# Patient Record
Sex: FEMALE | Race: WHITE | Employment: OTHER | URBAN - NONMETROPOLITAN AREA
[De-identification: names, ages, dates, MRNs, and addresses within clinical notes are randomized per-mention and may not be internally consistent; named-entity substitution may affect disease eponyms.]

---

## 2018-04-17 ENCOUNTER — HOSPITAL ENCOUNTER (OUTPATIENT)
Age: 58
Discharge: HOME | End: 2018-04-17
Payer: COMMERCIAL

## 2018-04-17 LAB — TSH SERPL DL<=0.05 MIU/L-ACNC: 3.54 MIU/L (ref 0.3–5)

## 2018-04-17 PROCEDURE — 84443 ASSAY THYROID STIM HORMONE: CPT

## 2018-04-17 PROCEDURE — 36415 COLL VENOUS BLD VENIPUNCTURE: CPT

## 2018-11-16 ENCOUNTER — HOSPITAL ENCOUNTER (OUTPATIENT)
Dept: CT IMAGING | Age: 58
Discharge: HOME | End: 2018-11-18
Payer: COMMERCIAL

## 2018-11-16 ENCOUNTER — HOSPITAL ENCOUNTER (OUTPATIENT)
Age: 58
Setting detail: SPECIMEN
Discharge: HOME | End: 2018-11-16
Payer: COMMERCIAL

## 2018-11-16 ENCOUNTER — OFFICE VISIT (OUTPATIENT)
Dept: UROLOGY | Age: 58
End: 2018-11-16
Payer: COMMERCIAL

## 2018-11-16 VITALS
SYSTOLIC BLOOD PRESSURE: 127 MMHG | BODY MASS INDEX: 26.98 KG/M2 | DIASTOLIC BLOOD PRESSURE: 80 MMHG | HEART RATE: 89 BPM | TEMPERATURE: 98 F | WEIGHT: 178 LBS | HEIGHT: 68 IN

## 2018-11-16 DIAGNOSIS — N23 RENAL COLIC: ICD-10-CM

## 2018-11-16 DIAGNOSIS — N23 RENAL COLIC: Primary | ICD-10-CM

## 2018-11-16 DIAGNOSIS — K59.00 CONSTIPATION, UNSPECIFIED CONSTIPATION TYPE: ICD-10-CM

## 2018-11-16 LAB
-: ABNORMAL
AMORPHOUS: ABNORMAL
BACTERIA: ABNORMAL
BILIRUBIN URINE: NEGATIVE
CASTS UA: ABNORMAL /LPF
COLOR: YELLOW
COMMENT UA: ABNORMAL
CRYSTALS, UA: ABNORMAL /HPF
EPITHELIAL CELLS UA: ABNORMAL /HPF (ref 0–25)
GLUCOSE URINE: NEGATIVE
KETONES, URINE: NEGATIVE
LEUKOCYTE ESTERASE, URINE: ABNORMAL
MUCUS: ABNORMAL
NITRITE, URINE: NEGATIVE
OTHER OBSERVATIONS UA: ABNORMAL
PH UA: 6 (ref 5–9)
PROTEIN UA: NEGATIVE
RBC UA: ABNORMAL /HPF (ref 0–2)
RENAL EPITHELIAL, UA: ABNORMAL /HPF
SPECIFIC GRAVITY UA: >1.03 (ref 1.01–1.02)
TRICHOMONAS: ABNORMAL
TURBIDITY: ABNORMAL
URINE HGB: ABNORMAL
UROBILINOGEN, URINE: NORMAL
WBC UA: ABNORMAL /HPF (ref 0–5)
YEAST: ABNORMAL

## 2018-11-16 PROCEDURE — G8419 CALC BMI OUT NRM PARAM NOF/U: HCPCS | Performed by: NURSE PRACTITIONER

## 2018-11-16 PROCEDURE — G8427 DOCREV CUR MEDS BY ELIG CLIN: HCPCS | Performed by: NURSE PRACTITIONER

## 2018-11-16 PROCEDURE — 81001 URINALYSIS AUTO W/SCOPE: CPT

## 2018-11-16 PROCEDURE — 3017F COLORECTAL CA SCREEN DOC REV: CPT | Performed by: NURSE PRACTITIONER

## 2018-11-16 PROCEDURE — G8484 FLU IMMUNIZE NO ADMIN: HCPCS | Performed by: NURSE PRACTITIONER

## 2018-11-16 PROCEDURE — 87086 URINE CULTURE/COLONY COUNT: CPT

## 2018-11-16 PROCEDURE — 74176 CT ABD & PELVIS W/O CONTRAST: CPT

## 2018-11-16 PROCEDURE — 1036F TOBACCO NON-USER: CPT | Performed by: NURSE PRACTITIONER

## 2018-11-16 PROCEDURE — 99204 OFFICE O/P NEW MOD 45 MIN: CPT | Performed by: NURSE PRACTITIONER

## 2018-11-16 RX ORDER — OMEGA-3 FATTY ACIDS/FISH OIL 300-1000MG
1 CAPSULE ORAL PRN
COMMUNITY
End: 2022-08-21

## 2018-11-16 RX ORDER — SILODOSIN 8 MG/1
8 CAPSULE ORAL EVERY EVENING
Qty: 30 CAPSULE | Refills: 0 | Status: SHIPPED | OUTPATIENT
Start: 2018-11-16 | End: 2018-11-19

## 2018-11-16 NOTE — PROGRESS NOTES
Bladderscan performed in office today:  Pt voided - 10 mL, PVR - 0 mL
this medication, may cause fatigue). 12 tablet 0     No facility-administered encounter medications on file as of 11/16/2018. Current Outpatient Prescriptions on File Prior to Visit   Medication Sig Dispense Refill    metFORMIN (GLUCOPHAGE) 500 MG tablet Take 500 mg by mouth 4 times daily       levothyroxine (SYNTHROID) 88 MCG tablet Take 88 mcg by mouth Daily. No current facility-administered medications on file prior to visit. Bactrim [sulfamethoxazole-trimethoprim] and Macrobid [nitrofurantoin monohyd macro]  Family History   Problem Relation Age of Onset    Diabetes Father     Kidney Disease Father     Cancer Father      History   Smoking Status    Never Smoker   Smokeless Tobacco    Never Used       History   Alcohol Use No       Review of Systems   Constitutional: Negative for appetite change, chills and fever. Eyes: Negative for pain, redness and visual disturbance. Respiratory: Negative for cough, shortness of breath and wheezing. Cardiovascular: Negative for chest pain and leg swelling. Gastrointestinal: Positive for abdominal pain and constipation. Negative for nausea and vomiting. Genitourinary: Positive for flank pain. Negative for difficulty urinating, dysuria, frequency, hematuria, pelvic pain, urgency, vaginal bleeding and vaginal discharge. Musculoskeletal: Negative for back pain, joint swelling and myalgias. Skin: Negative for color change, rash and wound. Neurological: Negative for dizziness, tremors and numbness. Hematological: Negative for adenopathy. Does not bruise/bleed easily. PHYSICAL EXAM:  Constitutional: Patient resting comfortably, in no acute distress. Neuro: Alert and oriented to person place and time. Cranial nerves grossly intact.     Psych: Mood and affect normal.  Skin: Warm, dry  HEENT: normocephalic, atraumatic  Lymphatics: No palpable lymphadenopathy  Lungs: Respiratory effort normal, unlabored  Cardiovascular:  Normal peripheral

## 2018-11-17 LAB
CULTURE: NO GROWTH
Lab: NORMAL
SPECIMEN DESCRIPTION: NORMAL
STATUS: NORMAL

## 2018-11-19 ENCOUNTER — TELEPHONE (OUTPATIENT)
Dept: UROLOGY | Age: 58
End: 2018-11-19

## 2018-11-19 PROBLEM — N23 RENAL COLIC: Status: ACTIVE | Noted: 2018-11-19

## 2018-11-19 PROBLEM — K59.00 CONSTIPATION: Status: ACTIVE | Noted: 2018-11-19

## 2018-11-19 ASSESSMENT — ENCOUNTER SYMPTOMS
NAUSEA: 0
CONSTIPATION: 1
COLOR CHANGE: 0
EYE PAIN: 0
WHEEZING: 0
VOMITING: 0
BACK PAIN: 0
SHORTNESS OF BREATH: 0
EYE REDNESS: 0
COUGH: 0
ABDOMINAL PAIN: 1

## 2018-11-21 ENCOUNTER — HOSPITAL ENCOUNTER (OUTPATIENT)
Age: 58
Setting detail: SPECIMEN
Discharge: HOME | End: 2018-11-21
Payer: COMMERCIAL

## 2018-11-21 ENCOUNTER — OFFICE VISIT (OUTPATIENT)
Dept: UROLOGY | Age: 58
End: 2018-11-21
Payer: COMMERCIAL

## 2018-11-21 VITALS
WEIGHT: 181 LBS | DIASTOLIC BLOOD PRESSURE: 76 MMHG | BODY MASS INDEX: 27.43 KG/M2 | HEIGHT: 68 IN | SYSTOLIC BLOOD PRESSURE: 104 MMHG

## 2018-11-21 DIAGNOSIS — N20.0 RENAL STONES: ICD-10-CM

## 2018-11-21 DIAGNOSIS — K59.00 CONSTIPATION, UNSPECIFIED CONSTIPATION TYPE: Primary | ICD-10-CM

## 2018-11-21 PROCEDURE — G8484 FLU IMMUNIZE NO ADMIN: HCPCS | Performed by: NURSE PRACTITIONER

## 2018-11-21 PROCEDURE — 99214 OFFICE O/P EST MOD 30 MIN: CPT | Performed by: NURSE PRACTITIONER

## 2018-11-21 PROCEDURE — 3017F COLORECTAL CA SCREEN DOC REV: CPT | Performed by: NURSE PRACTITIONER

## 2018-11-21 PROCEDURE — 1036F TOBACCO NON-USER: CPT | Performed by: NURSE PRACTITIONER

## 2018-11-21 PROCEDURE — G8427 DOCREV CUR MEDS BY ELIG CLIN: HCPCS | Performed by: NURSE PRACTITIONER

## 2018-11-21 PROCEDURE — G8419 CALC BMI OUT NRM PARAM NOF/U: HCPCS | Performed by: NURSE PRACTITIONER

## 2018-11-21 ASSESSMENT — ENCOUNTER SYMPTOMS
EYE REDNESS: 0
ABDOMINAL PAIN: 0
SHORTNESS OF BREATH: 0
BACK PAIN: 0
CONSTIPATION: 1
NAUSEA: 0
VOMITING: 0
COUGH: 0
EYE PAIN: 0
WHEEZING: 0
COLOR CHANGE: 0

## 2018-11-21 NOTE — PROGRESS NOTES
BUN 24 (H) 12/01/2013     Lab Results   Component Value Date    CREATININE 0.92 12/01/2013     Review of Systems   Constitutional: Negative for appetite change, chills and fever. Eyes: Negative for pain, redness and visual disturbance. Respiratory: Negative for cough, shortness of breath and wheezing. Cardiovascular: Negative for chest pain and leg swelling. Gastrointestinal: Positive for constipation. Negative for abdominal pain, nausea and vomiting. Genitourinary: Negative for difficulty urinating, dysuria, flank pain, frequency, hematuria, pelvic pain, urgency, vaginal bleeding and vaginal discharge. Musculoskeletal: Negative for back pain, joint swelling and myalgias. Skin: Negative for color change, rash and wound. Neurological: Negative for dizziness, tremors and numbness. Hematological: Negative for adenopathy. Does not bruise/bleed easily. Objective:   Physical Exam    Assessment:       Diagnosis Orders   1. Constipation, unspecified constipation type     2. Renal stones             Plan:      She will continue MiraLAX daily. I urged her to consume 80 ounces of water daily. To prevent future stone formation:  1) drink around 80 ounces of water per day  2) Avoid/minimize intake of \"bad fluids\" (soda pop, coffee, tea, alcohol, energy drinks)  3) reduce consumption of sodium/salt  4) reduce consumption of fatty animal protein (full-fat cheese/milk/dairy, red meats, pork). Limit protein intake to low-fat/lean options (low-fat dairy, fish, chicken, turkey)      We will see her back in 3-4 months to reevaluate her bowels.   Smith Santamaria, NAHOMY - CNP

## 2018-12-20 ENCOUNTER — HOSPITAL ENCOUNTER (OUTPATIENT)
Dept: WOMENS IMAGING | Age: 58
Discharge: HOME | End: 2018-12-22
Payer: COMMERCIAL

## 2018-12-20 DIAGNOSIS — Z12.39 SCREENING BREAST EXAMINATION: ICD-10-CM

## 2018-12-20 PROCEDURE — 77063 BREAST TOMOSYNTHESIS BI: CPT

## 2018-12-20 PROCEDURE — 77067 SCR MAMMO BI INCL CAD: CPT

## 2019-05-08 ENCOUNTER — HOSPITAL ENCOUNTER (OUTPATIENT)
Age: 59
Setting detail: SPECIMEN
Discharge: HOME | End: 2019-05-08
Payer: COMMERCIAL

## 2019-05-08 ENCOUNTER — OFFICE VISIT (OUTPATIENT)
Dept: UROLOGY | Age: 59
End: 2019-05-08
Payer: COMMERCIAL

## 2019-05-08 VITALS
BODY MASS INDEX: 28.41 KG/M2 | SYSTOLIC BLOOD PRESSURE: 110 MMHG | DIASTOLIC BLOOD PRESSURE: 80 MMHG | WEIGHT: 181 LBS | HEIGHT: 67 IN

## 2019-05-08 DIAGNOSIS — R10.2 SUPRAPUBIC PAIN: ICD-10-CM

## 2019-05-08 DIAGNOSIS — K59.00 CONSTIPATION, UNSPECIFIED CONSTIPATION TYPE: ICD-10-CM

## 2019-05-08 DIAGNOSIS — R35.1 NOCTURIA: ICD-10-CM

## 2019-05-08 DIAGNOSIS — R10.2 SUPRAPUBIC PAIN: Primary | ICD-10-CM

## 2019-05-08 LAB
-: NORMAL
AMORPHOUS: NORMAL
BACTERIA: NORMAL
BILIRUBIN URINE: NEGATIVE
CASTS UA: NORMAL /LPF
COLOR: YELLOW
COMMENT UA: NORMAL
CRYSTALS, UA: NORMAL /HPF
DIRECT EXAM: NORMAL
EPITHELIAL CELLS UA: NORMAL /HPF (ref 0–25)
GLUCOSE URINE: NEGATIVE
KETONES, URINE: NEGATIVE
LEUKOCYTE ESTERASE, URINE: NEGATIVE
Lab: NORMAL
MUCUS: NORMAL
NITRITE, URINE: NEGATIVE
OTHER OBSERVATIONS UA: NORMAL
PH UA: 6 (ref 5–9)
PROTEIN UA: NEGATIVE
RBC UA: NORMAL /HPF (ref 0–2)
RENAL EPITHELIAL, UA: NORMAL /HPF
SPECIFIC GRAVITY UA: 1.02 (ref 1.01–1.02)
SPECIMEN DESCRIPTION: NORMAL
TRICHOMONAS: NORMAL
TURBIDITY: CLEAR
URINE HGB: NEGATIVE
UROBILINOGEN, URINE: NORMAL
WBC UA: NORMAL /HPF (ref 0–5)
YEAST: NORMAL

## 2019-05-08 PROCEDURE — 51798 US URINE CAPACITY MEASURE: CPT | Performed by: NURSE PRACTITIONER

## 2019-05-08 PROCEDURE — G8419 CALC BMI OUT NRM PARAM NOF/U: HCPCS | Performed by: NURSE PRACTITIONER

## 2019-05-08 PROCEDURE — 87210 SMEAR WET MOUNT SALINE/INK: CPT

## 2019-05-08 PROCEDURE — G8427 DOCREV CUR MEDS BY ELIG CLIN: HCPCS | Performed by: NURSE PRACTITIONER

## 2019-05-08 PROCEDURE — 81001 URINALYSIS AUTO W/SCOPE: CPT

## 2019-05-08 PROCEDURE — 3017F COLORECTAL CA SCREEN DOC REV: CPT | Performed by: NURSE PRACTITIONER

## 2019-05-08 PROCEDURE — 1036F TOBACCO NON-USER: CPT | Performed by: NURSE PRACTITIONER

## 2019-05-08 PROCEDURE — 99214 OFFICE O/P EST MOD 30 MIN: CPT | Performed by: NURSE PRACTITIONER

## 2019-05-08 PROCEDURE — 87086 URINE CULTURE/COLONY COUNT: CPT

## 2019-05-08 RX ORDER — POLYETHYLENE GLYCOL 3350 17 G/17G
17 POWDER, FOR SOLUTION ORAL DAILY
COMMUNITY
End: 2021-06-24

## 2019-05-08 ASSESSMENT — ENCOUNTER SYMPTOMS
WHEEZING: 0
CONSTIPATION: 1
VOMITING: 0
NAUSEA: 0
COUGH: 0
EYE PAIN: 0
EYE REDNESS: 0
SHORTNESS OF BREATH: 0
COLOR CHANGE: 0
ABDOMINAL PAIN: 0
BACK PAIN: 0

## 2019-05-08 NOTE — PROGRESS NOTES
HPI:    Patient is a 60 y.o. female in no acute distress. She is alert and oriented to person, place, and time. History  Referral from Dr. Abhijeet Sims for flank pain. Patient has had intermittent flank pain for several years. She does have a history of stones, has never had surgery. She spends a significant amount of time in [place] and has seen a urologist in [place] in the past.  She denies gross hematuria, dysuria, frequency, nocturia, urgency, or incontinence. She does report significant constipation, and has noticed worsening flank and abdominal pain with worsening bowel issues. She will go several days without a bowel movement. She denies history of frequent urinary tract infections. She did have a CT scan performed in 2013 that did show nonobstructing bilateral renal stones. 11/2018 CT showed bilateral punctate, nonobstructing stones. Recent urine culture was negative for infection, but did show 2-5 rbc/hpf. She is taking MiraLAX daily for her bowels. She denies any flank or abdominal pain, dysuria or gross hematuria. Previous urology records summarized below:  12/2013 CT urogram showed 2 x 4 distal ureteral stone, no filling defects to suggest malignancy. Cystoscopy was negative. She did do a 24-hour urine collection on 2/2014 that showed extreme hypercalciuria, borderline low urine pH, and mild hyperuricosuira. She was started on hydrochlorothiazide 12.5 mg daily. Today  Here today to follow-up for constipation. She was previously taking MiraLAX for 3 months and was having daily bowel movements. Over the last 4-6 weeks she did stop taking MiraLAX. Since stopping this she was evaluated in urgent care for her urinary tract infection. She was started on Omnicef. I do not have any culture results available. She has also developed suprapubic pain and nocturia ×4. At her prior visit she did not have any pain or urinary symptoms. Her symptoms are new since stopping the MiraLAX.   She denies any dysuria or gross hematuria. 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  [DISCONTINUED] metFORMIN (GLUCOPHAGE) 500 MG tablet Take 500 mg by mouth 4 times daily        No facility-administered encounter medications on file as of 5/8/2019. Current Outpatient Medications on File Prior to Visit   Medication Sig Dispense Refill    polyethylene glycol (GLYCOLAX) packet Take 17 g by mouth daily      ibuprofen (ADVIL) 200 MG CAPS Take 1 capsule by mouth as needed for Fever      levothyroxine (SYNTHROID) 88 MCG tablet Take 88 mcg by mouth Daily. No current facility-administered medications on file prior to visit. Bactrim [sulfamethoxazole-trimethoprim] and Macrobid [nitrofurantoin monohyd macro]  Family History   Problem Relation Age of Onset    Diabetes Father     Kidney Disease Father     Cancer Father      Social History     Tobacco Use   Smoking Status Never Smoker   Smokeless Tobacco Never Used       Social History     Substance and Sexual Activity   Alcohol Use No       Review of Systems   Constitutional: Negative for appetite change, chills and fever. Eyes: Negative for pain, redness and visual disturbance. Respiratory: Negative for cough, shortness of breath and wheezing. Cardiovascular: Negative for chest pain and leg swelling. Gastrointestinal: Positive for constipation. Negative for abdominal pain, nausea and vomiting. Genitourinary: Positive for enuresis and pelvic pain. Negative for difficulty urinating, dysuria, flank pain, frequency, hematuria, urgency, vaginal bleeding and vaginal discharge. Musculoskeletal: Negative for back pain, joint swelling and myalgias. Skin: Negative for color change, rash and wound. Neurological: Negative for dizziness, tremors and numbness. Hematological: Negative for adenopathy. Does not bruise/bleed easily. /80 (Site: Right Upper Arm, Position: Sitting, Cuff Size: Medium Adult)   Ht 5' 7\" (1.702 m)   Wt 181 lb (82.1 kg)   LMP  (LMP Unknown)   BMI 28.35 kg/m²       PHYSICAL EXAM:  Constitutional: Patient resting comfortably, in no acute distress. Neuro: Alert and oriented to person place and time. Cranial nerves grossly intact. Psych: Mood and affect normal.  Skin: Warm, dry  HEENT: normocephalic, atraumatic  Lymphatics: No palpable lymphadenopathy  Lungs: Respiratory effort normal, unlabored  Cardiovascular:  Normal peripheral pulses  Abdomen: Soft, non-tender, non-distended with no organomegaly or palpable masses. : No CVA tenderness. Bladder non-tender and not distended. Pelvic: Deferred    Lab Results   Component Value Date    BUN 24 (H) 12/01/2013     Lab Results   Component Value Date    CREATININE 0.92 12/01/2013       ASSESSMENT:   Diagnosis Orders   1. Suprapubic pain  Urinalysis with Microscopic    Urine culture clean catch    Wet prep, genital    CO MEASUREMENT,POST-VOID RESIDUAL VOLUME BY US,NON-IMAGING   2. Constipation, unspecified constipation type     3. Nocturia  Urinalysis with Microscopic    Urine culture clean catch    Wet prep, genital    CO MEASUREMENT,POST-VOID RESIDUAL VOLUME BY US,NON-IMAGING           PLAN:  I did offer to recheck a CT scan, but she declined. We will check a UA C&S and a vaginal swab. She will resume MiraLAX daily.       If all testing is negative I recommend that we proceed with a CT scan due to her history of stones. I also recommend that we proceed with cystoscopy due to her history of microscopic hematuria and the last cystoscopy she had was performed in 2013. We'll call patient with the results.

## 2019-05-08 NOTE — PATIENT INSTRUCTIONS
It is very important to have regular, daily, bowel movements because this will improve urinary symptoms. Take Miralax (or generic equivalent) 17 g once daily (one capful). Do not skip days. If 1 dose daily causes loose stools/diarrhea, decrease to 1/2 dose or 1/4 dose but be sure to continue taking it daily (it is not the type of medication that you can just use \"as needed,\" must be taken daily to be effective).        Be sure to increase fluids, drink 80 ounces of WATER every day

## 2019-05-08 NOTE — PROGRESS NOTES
Bladderscan performed in office today:  Pt voided - 100 mL, PVR - 0 mL      Obtained genital swab sample during the visit today.

## 2019-05-10 ENCOUNTER — TELEPHONE (OUTPATIENT)
Dept: UROLOGY | Age: 59
End: 2019-05-10

## 2019-05-10 LAB
CULTURE: NO GROWTH
Lab: NORMAL
SPECIMEN DESCRIPTION: NORMAL

## 2019-05-10 NOTE — TELEPHONE ENCOUNTER
----- Message from NAHOMY Taylor - CNP sent at 5/10/2019  7:22 AM EDT -----  Call pt - urine cx reviewed and negative for UTI, infection & for significant microhematuria. Take miralax daily. If she continues to have pain we should consider a CT and cystoscopy. If she would like to take miralax for three months and she how she does that is fine too.

## 2019-05-10 NOTE — RESULT ENCOUNTER NOTE
Call pt - urine cx reviewed and negative for UTI, infection & for significant microhematuria. Take miralax daily. If she continues to have pain we should consider a CT and cystoscopy. If she would like to take miralax for three months and she how she does that is fine too.

## 2019-05-20 ENCOUNTER — TELEPHONE (OUTPATIENT)
Dept: UROLOGY | Age: 59
End: 2019-05-20

## 2019-05-20 ENCOUNTER — HOSPITAL ENCOUNTER (OUTPATIENT)
Dept: CT IMAGING | Age: 59
Discharge: HOME | End: 2019-05-22
Payer: COMMERCIAL

## 2019-05-20 DIAGNOSIS — N23 RENAL COLIC: ICD-10-CM

## 2019-05-20 PROCEDURE — 74176 CT ABD & PELVIS W/O CONTRAST: CPT

## 2019-05-20 NOTE — TELEPHONE ENCOUNTER
----- Message from NAHOMY Crum CNP sent at 5/20/2019  2:51 PM EDT -----  Call patient and have her come in NPO tomorrow morning. CT shows a stone in the ureter.  [reviewed with Dr. Melissa Rouse

## 2019-05-21 ENCOUNTER — ANESTHESIA EVENT (OUTPATIENT)
Dept: OPERATING ROOM | Age: 59
End: 2019-05-21
Payer: COMMERCIAL

## 2019-05-21 ENCOUNTER — OFFICE VISIT (OUTPATIENT)
Dept: UROLOGY | Age: 59
End: 2019-05-21
Payer: COMMERCIAL

## 2019-05-21 ENCOUNTER — HOSPITAL ENCOUNTER (OUTPATIENT)
Age: 59
Discharge: HOME | End: 2019-05-21
Payer: COMMERCIAL

## 2019-05-21 ENCOUNTER — APPOINTMENT (OUTPATIENT)
Dept: GENERAL RADIOLOGY | Age: 59
End: 2019-05-21
Attending: UROLOGY
Payer: COMMERCIAL

## 2019-05-21 ENCOUNTER — ANESTHESIA (OUTPATIENT)
Dept: OPERATING ROOM | Age: 59
End: 2019-05-21
Payer: COMMERCIAL

## 2019-05-21 ENCOUNTER — HOSPITAL ENCOUNTER (OUTPATIENT)
Age: 59
Discharge: HOME | End: 2019-05-21
Attending: UROLOGY
Payer: COMMERCIAL

## 2019-05-21 ENCOUNTER — HOSPITAL ENCOUNTER (OUTPATIENT)
Age: 59
Setting detail: OUTPATIENT SURGERY
Discharge: HOME | End: 2019-05-21
Attending: UROLOGY | Admitting: UROLOGY
Payer: COMMERCIAL

## 2019-05-21 VITALS
BODY MASS INDEX: 28.88 KG/M2 | RESPIRATION RATE: 18 BRPM | DIASTOLIC BLOOD PRESSURE: 71 MMHG | HEART RATE: 61 BPM | SYSTOLIC BLOOD PRESSURE: 113 MMHG | OXYGEN SATURATION: 97 % | TEMPERATURE: 97.8 F | HEIGHT: 67 IN | WEIGHT: 184 LBS

## 2019-05-21 VITALS
SYSTOLIC BLOOD PRESSURE: 119 MMHG | TEMPERATURE: 98.6 F | OXYGEN SATURATION: 96 % | DIASTOLIC BLOOD PRESSURE: 64 MMHG | RESPIRATION RATE: 9 BRPM

## 2019-05-21 VITALS
DIASTOLIC BLOOD PRESSURE: 82 MMHG | TEMPERATURE: 97.9 F | WEIGHT: 184 LBS | BODY MASS INDEX: 28.82 KG/M2 | SYSTOLIC BLOOD PRESSURE: 106 MMHG

## 2019-05-21 DIAGNOSIS — Z01.818 PRE-OP TESTING: ICD-10-CM

## 2019-05-21 DIAGNOSIS — N23 RENAL COLIC: Primary | ICD-10-CM

## 2019-05-21 DIAGNOSIS — N20.1 URETERAL CALCULUS: ICD-10-CM

## 2019-05-21 DIAGNOSIS — N20.1 URETERAL STONE: Primary | ICD-10-CM

## 2019-05-21 DIAGNOSIS — N20.1 URETERAL STONE: ICD-10-CM

## 2019-05-21 DIAGNOSIS — N20.0 RENAL STONES: ICD-10-CM

## 2019-05-21 LAB
ANION GAP SERPL CALCULATED.3IONS-SCNC: 11 MMOL/L (ref 9–17)
BUN BLDV-MCNC: 23 MG/DL (ref 6–20)
BUN/CREAT BLD: 27 (ref 9–20)
CALCIUM SERPL-MCNC: 9.6 MG/DL (ref 8.6–10.4)
CHLORIDE BLD-SCNC: 102 MMOL/L (ref 98–107)
CO2: 27 MMOL/L (ref 20–31)
CREAT SERPL-MCNC: 0.85 MG/DL (ref 0.5–0.9)
EKG ATRIAL RATE: 69 BPM
EKG P AXIS: 33 DEGREES
EKG P-R INTERVAL: 120 MS
EKG Q-T INTERVAL: 392 MS
EKG QRS DURATION: 90 MS
EKG QTC CALCULATION (BAZETT): 420 MS
EKG R AXIS: 56 DEGREES
EKG T AXIS: 42 DEGREES
EKG VENTRICULAR RATE: 69 BPM
GFR AFRICAN AMERICAN: >60 ML/MIN
GFR NON-AFRICAN AMERICAN: >60 ML/MIN
GFR SERPL CREATININE-BSD FRML MDRD: ABNORMAL ML/MIN/{1.73_M2}
GFR SERPL CREATININE-BSD FRML MDRD: ABNORMAL ML/MIN/{1.73_M2}
GLUCOSE BLD-MCNC: 103 MG/DL (ref 70–99)
POTASSIUM SERPL-SCNC: 4.5 MMOL/L (ref 3.7–5.3)
SODIUM BLD-SCNC: 140 MMOL/L (ref 135–144)

## 2019-05-21 PROCEDURE — 82365 CALCULUS SPECTROSCOPY: CPT

## 2019-05-21 PROCEDURE — 3017F COLORECTAL CA SCREEN DOC REV: CPT | Performed by: NURSE PRACTITIONER

## 2019-05-21 PROCEDURE — 6360000002 HC RX W HCPCS: Performed by: UROLOGY

## 2019-05-21 PROCEDURE — 6370000000 HC RX 637 (ALT 250 FOR IP): Performed by: UROLOGY

## 2019-05-21 PROCEDURE — G8427 DOCREV CUR MEDS BY ELIG CLIN: HCPCS | Performed by: NURSE PRACTITIONER

## 2019-05-21 PROCEDURE — 93005 ELECTROCARDIOGRAM TRACING: CPT

## 2019-05-21 PROCEDURE — 99215 OFFICE O/P EST HI 40 MIN: CPT | Performed by: NURSE PRACTITIONER

## 2019-05-21 PROCEDURE — 2500000003 HC RX 250 WO HCPCS: Performed by: NURSE ANESTHETIST, CERTIFIED REGISTERED

## 2019-05-21 PROCEDURE — 3600000004 HC SURGERY LEVEL 4 BASE: Performed by: UROLOGY

## 2019-05-21 PROCEDURE — 2580000003 HC RX 258: Performed by: UROLOGY

## 2019-05-21 PROCEDURE — 3700000000 HC ANESTHESIA ATTENDED CARE: Performed by: UROLOGY

## 2019-05-21 PROCEDURE — 1036F TOBACCO NON-USER: CPT | Performed by: NURSE PRACTITIONER

## 2019-05-21 PROCEDURE — 7100000000 HC PACU RECOVERY - FIRST 15 MIN: Performed by: UROLOGY

## 2019-05-21 PROCEDURE — 3700000001 HC ADD 15 MINUTES (ANESTHESIA): Performed by: UROLOGY

## 2019-05-21 PROCEDURE — 3600000014 HC SURGERY LEVEL 4 ADDTL 15MIN: Performed by: UROLOGY

## 2019-05-21 PROCEDURE — 6360000002 HC RX W HCPCS: Performed by: NURSE ANESTHETIST, CERTIFIED REGISTERED

## 2019-05-21 PROCEDURE — 74018 RADEX ABDOMEN 1 VIEW: CPT

## 2019-05-21 PROCEDURE — 6370000000 HC RX 637 (ALT 250 FOR IP)

## 2019-05-21 PROCEDURE — 2709999900 HC NON-CHARGEABLE SUPPLY: Performed by: UROLOGY

## 2019-05-21 PROCEDURE — C1769 GUIDE WIRE: HCPCS | Performed by: UROLOGY

## 2019-05-21 PROCEDURE — G8419 CALC BMI OUT NRM PARAM NOF/U: HCPCS | Performed by: NURSE PRACTITIONER

## 2019-05-21 PROCEDURE — 36415 COLL VENOUS BLD VENIPUNCTURE: CPT

## 2019-05-21 PROCEDURE — 2720000010 HC SURG SUPPLY STERILE: Performed by: UROLOGY

## 2019-05-21 PROCEDURE — 80048 BASIC METABOLIC PNL TOTAL CA: CPT

## 2019-05-21 PROCEDURE — C2617 STENT, NON-COR, TEM W/O DEL: HCPCS | Performed by: UROLOGY

## 2019-05-21 PROCEDURE — 7100000001 HC PACU RECOVERY - ADDTL 15 MIN: Performed by: UROLOGY

## 2019-05-21 PROCEDURE — 7100000011 HC PHASE II RECOVERY - ADDTL 15 MIN: Performed by: UROLOGY

## 2019-05-21 PROCEDURE — 7100000010 HC PHASE II RECOVERY - FIRST 15 MIN: Performed by: UROLOGY

## 2019-05-21 DEVICE — URETERAL STENT
Type: IMPLANTABLE DEVICE | Site: URETER | Status: FUNCTIONAL
Brand: PERCUFLEX™ PLUS

## 2019-05-21 RX ORDER — ATROPA BELLADONNA AND OPIUM 16.2; 6 MG/1; MG/1
30 SUPPOSITORY RECTAL ONCE
Status: CANCELLED | OUTPATIENT
Start: 2019-05-21

## 2019-05-21 RX ORDER — HYDROCODONE BITARTRATE AND ACETAMINOPHEN 5; 325 MG/1; MG/1
1 TABLET ORAL EVERY 4 HOURS PRN
Qty: 18 TABLET | Refills: 0 | Status: SHIPPED | OUTPATIENT
Start: 2019-05-21 | End: 2019-05-24

## 2019-05-21 RX ORDER — DIMENHYDRINATE 50 MG/1
50 TABLET ORAL ONCE
Status: COMPLETED | OUTPATIENT
Start: 2019-05-21 | End: 2019-05-21

## 2019-05-21 RX ORDER — MEPERIDINE HYDROCHLORIDE 50 MG/ML
12.5 INJECTION INTRAMUSCULAR; INTRAVENOUS; SUBCUTANEOUS EVERY 5 MIN PRN
Status: DISCONTINUED | OUTPATIENT
Start: 2019-05-21 | End: 2019-05-21 | Stop reason: HOSPADM

## 2019-05-21 RX ORDER — HYDROCODONE BITARTRATE AND ACETAMINOPHEN 5; 325 MG/1; MG/1
1 TABLET ORAL
Status: COMPLETED | OUTPATIENT
Start: 2019-05-21 | End: 2019-05-21

## 2019-05-21 RX ORDER — ATROPA BELLADONNA AND OPIUM 16.2; 6 MG/1; MG/1
60 SUPPOSITORY RECTAL ONCE
Status: DISCONTINUED | OUTPATIENT
Start: 2019-05-21 | End: 2019-05-21

## 2019-05-21 RX ORDER — PROMETHAZINE HYDROCHLORIDE 25 MG/ML
6.25 INJECTION, SOLUTION INTRAMUSCULAR; INTRAVENOUS
Status: DISCONTINUED | OUTPATIENT
Start: 2019-05-21 | End: 2019-05-21 | Stop reason: HOSPADM

## 2019-05-21 RX ORDER — LIDOCAINE HYDROCHLORIDE 20 MG/ML
INJECTION, SOLUTION EPIDURAL; INFILTRATION; INTRACAUDAL; PERINEURAL PRN
Status: DISCONTINUED | OUTPATIENT
Start: 2019-05-21 | End: 2019-05-21 | Stop reason: SDUPTHER

## 2019-05-21 RX ORDER — DEXAMETHASONE SODIUM PHOSPHATE 4 MG/ML
INJECTION, SOLUTION INTRA-ARTICULAR; INTRALESIONAL; INTRAMUSCULAR; INTRAVENOUS; SOFT TISSUE PRN
Status: DISCONTINUED | OUTPATIENT
Start: 2019-05-21 | End: 2019-05-21 | Stop reason: SDUPTHER

## 2019-05-21 RX ORDER — ONDANSETRON 2 MG/ML
4 INJECTION INTRAMUSCULAR; INTRAVENOUS
Status: DISCONTINUED | OUTPATIENT
Start: 2019-05-21 | End: 2019-05-21 | Stop reason: HOSPADM

## 2019-05-21 RX ORDER — SODIUM CHLORIDE, SODIUM LACTATE, POTASSIUM CHLORIDE, CALCIUM CHLORIDE 600; 310; 30; 20 MG/100ML; MG/100ML; MG/100ML; MG/100ML
INJECTION, SOLUTION INTRAVENOUS CONTINUOUS
Status: DISCONTINUED | OUTPATIENT
Start: 2019-05-21 | End: 2019-05-21 | Stop reason: HOSPADM

## 2019-05-21 RX ORDER — ATROPA BELLADONNA AND OPIUM 16.2; 3 MG/1; MG/1
SUPPOSITORY RECTAL
Status: COMPLETED
Start: 2019-05-21 | End: 2019-05-21

## 2019-05-21 RX ORDER — FENTANYL CITRATE 50 UG/ML
50 INJECTION, SOLUTION INTRAMUSCULAR; INTRAVENOUS EVERY 5 MIN PRN
Status: DISCONTINUED | OUTPATIENT
Start: 2019-05-21 | End: 2019-05-21 | Stop reason: HOSPADM

## 2019-05-21 RX ORDER — ONDANSETRON 2 MG/ML
INJECTION INTRAMUSCULAR; INTRAVENOUS PRN
Status: DISCONTINUED | OUTPATIENT
Start: 2019-05-21 | End: 2019-05-21 | Stop reason: SDUPTHER

## 2019-05-21 RX ORDER — CIPROFLOXACIN 2 MG/ML
400 INJECTION, SOLUTION INTRAVENOUS
Status: COMPLETED | OUTPATIENT
Start: 2019-05-21 | End: 2019-05-21

## 2019-05-21 RX ORDER — ACETAMINOPHEN 325 MG/1
650 TABLET ORAL ONCE
Status: COMPLETED | OUTPATIENT
Start: 2019-05-21 | End: 2019-05-21

## 2019-05-21 RX ORDER — FENTANYL CITRATE 50 UG/ML
INJECTION, SOLUTION INTRAMUSCULAR; INTRAVENOUS PRN
Status: DISCONTINUED | OUTPATIENT
Start: 2019-05-21 | End: 2019-05-21 | Stop reason: SDUPTHER

## 2019-05-21 RX ORDER — KETOROLAC TROMETHAMINE 30 MG/ML
INJECTION, SOLUTION INTRAMUSCULAR; INTRAVENOUS PRN
Status: DISCONTINUED | OUTPATIENT
Start: 2019-05-21 | End: 2019-05-21 | Stop reason: SDUPTHER

## 2019-05-21 RX ORDER — PROPOFOL 10 MG/ML
INJECTION, EMULSION INTRAVENOUS PRN
Status: DISCONTINUED | OUTPATIENT
Start: 2019-05-21 | End: 2019-05-21 | Stop reason: SDUPTHER

## 2019-05-21 RX ADMIN — KETOROLAC TROMETHAMINE 30 MG: 30 INJECTION, SOLUTION INTRAMUSCULAR; INTRAVENOUS at 16:05

## 2019-05-21 RX ADMIN — ACETAMINOPHEN 650 MG: 325 TABLET, FILM COATED ORAL at 15:37

## 2019-05-21 RX ADMIN — CIPROFLOXACIN 400 MG: 2 INJECTION, SOLUTION INTRAVENOUS at 15:37

## 2019-05-21 RX ADMIN — FENTANYL CITRATE 50 MCG: 50 INJECTION INTRAMUSCULAR; INTRAVENOUS at 15:43

## 2019-05-21 RX ADMIN — DEXAMETHASONE SODIUM PHOSPHATE 8 MG: 4 INJECTION, SOLUTION INTRAMUSCULAR; INTRAVENOUS at 15:51

## 2019-05-21 RX ADMIN — PROPOFOL 150 MG: 10 INJECTION, EMULSION INTRAVENOUS at 15:45

## 2019-05-21 RX ADMIN — ATROPA BELLADONNA AND OPIUM 30 MG: 16.2; 3 SUPPOSITORY RECTAL at 17:51

## 2019-05-21 RX ADMIN — SODIUM CHLORIDE, POTASSIUM CHLORIDE, SODIUM LACTATE AND CALCIUM CHLORIDE: 600; 310; 30; 20 INJECTION, SOLUTION INTRAVENOUS at 15:37

## 2019-05-21 RX ADMIN — DIMENHYDRINATE 50 MG: 50 TABLET ORAL at 15:37

## 2019-05-21 RX ADMIN — FENTANYL CITRATE 25 MCG: 50 INJECTION INTRAMUSCULAR; INTRAVENOUS at 16:05

## 2019-05-21 RX ADMIN — ONDANSETRON 4 MG: 2 INJECTION INTRAMUSCULAR; INTRAVENOUS at 15:51

## 2019-05-21 RX ADMIN — FENTANYL CITRATE 25 MCG: 50 INJECTION INTRAMUSCULAR; INTRAVENOUS at 15:56

## 2019-05-21 RX ADMIN — HYDROCODONE BITARTRATE AND ACETAMINOPHEN 1 TABLET: 5; 325 TABLET ORAL at 17:08

## 2019-05-21 RX ADMIN — LIDOCAINE HYDROCHLORIDE 60 MG: 20 INJECTION, SOLUTION EPIDURAL; INFILTRATION; INTRACAUDAL at 15:45

## 2019-05-21 ASSESSMENT — PAIN DESCRIPTION - ONSET: ONSET: GRADUAL

## 2019-05-21 ASSESSMENT — PULMONARY FUNCTION TESTS
PIF_VALUE: 12
PIF_VALUE: 8
PIF_VALUE: 12
PIF_VALUE: 1
PIF_VALUE: 11
PIF_VALUE: 10
PIF_VALUE: 12
PIF_VALUE: 10
PIF_VALUE: 1
PIF_VALUE: 8
PIF_VALUE: 0
PIF_VALUE: 12
PIF_VALUE: 12
PIF_VALUE: 10
PIF_VALUE: 12
PIF_VALUE: 2
PIF_VALUE: 1
PIF_VALUE: 20
PIF_VALUE: 0
PIF_VALUE: 1
PIF_VALUE: 1
PIF_VALUE: 2
PIF_VALUE: 12
PIF_VALUE: 12
PIF_VALUE: 0
PIF_VALUE: 4
PIF_VALUE: 12
PIF_VALUE: 18
PIF_VALUE: 12
PIF_VALUE: 10
PIF_VALUE: 12
PIF_VALUE: 8
PIF_VALUE: 1

## 2019-05-21 ASSESSMENT — PAIN - FUNCTIONAL ASSESSMENT: PAIN_FUNCTIONAL_ASSESSMENT: 0-10

## 2019-05-21 ASSESSMENT — PAIN DESCRIPTION - PAIN TYPE
TYPE: SURGICAL PAIN
TYPE: ACUTE PAIN

## 2019-05-21 ASSESSMENT — PAIN SCALES - GENERAL
PAINLEVEL_OUTOF10: 7
PAINLEVEL_OUTOF10: 6
PAINLEVEL_OUTOF10: 7
PAINLEVEL_OUTOF10: 0
PAINLEVEL_OUTOF10: 6
PAINLEVEL_OUTOF10: 8
PAINLEVEL_OUTOF10: 0
PAINLEVEL_OUTOF10: 0

## 2019-05-21 ASSESSMENT — PAIN DESCRIPTION - PROGRESSION
CLINICAL_PROGRESSION: GRADUALLY WORSENING
CLINICAL_PROGRESSION: NOT CHANGED
CLINICAL_PROGRESSION: GRADUALLY IMPROVING
CLINICAL_PROGRESSION: NOT CHANGED

## 2019-05-21 ASSESSMENT — ENCOUNTER SYMPTOMS
NAUSEA: 0
BACK PAIN: 0
EYE REDNESS: 0
SHORTNESS OF BREATH: 0
CONSTIPATION: 0
EYE PAIN: 0
ABDOMINAL PAIN: 0
COUGH: 0
VOMITING: 0
COLOR CHANGE: 0
WHEEZING: 0

## 2019-05-21 ASSESSMENT — PAIN DESCRIPTION - ORIENTATION
ORIENTATION: RIGHT
ORIENTATION: RIGHT

## 2019-05-21 ASSESSMENT — PAIN DESCRIPTION - FREQUENCY: FREQUENCY: CONTINUOUS

## 2019-05-21 ASSESSMENT — PAIN DESCRIPTION - LOCATION
LOCATION: PERINEUM
LOCATION: FLANK;PERINEUM
LOCATION: VAGINA;PERINEUM

## 2019-05-21 ASSESSMENT — PAIN DESCRIPTION - DESCRIPTORS
DESCRIPTORS: ACHING;SPASM
DESCRIPTORS: ACHING
DESCRIPTORS: ACHING;SHARP
DESCRIPTORS: ACHING;SHARP

## 2019-05-21 NOTE — PROGRESS NOTES
Dr. Fuad Reed notified of patient concerns; orders received for a KUB; reviewed with patient and patient family; verbalizes understanding.

## 2019-05-21 NOTE — H&P
History and Physical    Patient:  Franco Poole  MRN: [id number]    CHIEF COMPLAINT:  Right flank pain    HISTORY OF PRESENT ILLNESS:   The patient is a 60 y.o. female who presents with right flank pain. Referral from Dr. Cecilia Manning for flank pain. Patient has had intermittent flank pain for several years. She does have a history of stones, has never had surgery. She spends a significant amount of time in [place] and has seen a urologist in [place] in the past.  She denies gross hematuria, dysuria, frequency, nocturia, urgency, or incontinence. She does report significant constipation, and has noticed worsening flank and abdominal pain with worsening bowel issues. She will go several days without a bowel movement. She denies history of frequent urinary tract infections. She did have a CT scan performed in 2013 that did show nonobstructing bilateral renal stones. 11/2018 CT showed bilateral punctate, nonobstructing stones. Recent urine culture was negative for infection, but did show 2-5 rbc/hpf. She is taking MiraLAX daily for her bowels. She denies any flank or abdominal pain, dysuria or gross hematuria.     Previous urology records summarized below:  12/2013 CT urogram showed 2 x 4 distal ureteral stone, no filling defects to suggest malignancy. Cystoscopy was negative.     She did do a 24-hour urine collection on 2/2014 that showed extreme hypercalciuria, borderline low urine pH, and mild hyperuricosuira. She was started on hydrochlorothiazide 12.5 mg daily.     Today  Here today due to complaints of right groin pain and urgency. We did see patient in the office last week due to complaints of suprapubic pain. We did mutually agreed that it was secondary to constipation because she stopped taking her MiraLAX. She did send us a message through my chart with complaints of worsening pain and urgency. We did order a CT stone protocol.   This film was independently reviewed, and reviewed with  Pablo Pruitt, and compared to prior CT from 11/2018 and shows a 5 mm distal right ureteral stone without hydroureteronephrosis. She does have bilateral nonobstructing stones. Her current pain is 6/10. She is afebrile. She has had a few sips of water this morning, but nothing to eat since 9:00 last night. Urine culture from 5/8/19 was negative for infection or significant hematuria. 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 Authorizing Provider   polyethylene glycol (GLYCOLAX) packet Take 17 g by mouth daily    Historical Provider, MD   ibuprofen (ADVIL) 200 MG CAPS Take 1 capsule by mouth as needed for Fever    Historical Provider, MD   levothyroxine (SYNTHROID) 88 MCG tablet Take 88 mcg by mouth Daily.     Historical Provider, MD       Allergies:  Bactrim [sulfamethoxazole-trimethoprim] and Macrobid [nitrofurantoin monohyd macro]    Social History:    Social History     Socioeconomic History    Marital status:      Spouse name: Not on file    Number of children: Not on file    Years of education: Not on file    Highest education level: Not on file   Occupational History    Not on file   Social Needs    Financial resource strain: Not on file    Food insecurity:     Worry: Not on file     Inability: Not on file    Transportation needs:     Medical: Not on file     Non-medical: Not on file   Tobacco Use    Smoking status: Never Smoker    Smokeless tobacco: Never Used   Substance and Sexual Activity    Alcohol use: No    Drug use: Not on file    Sexual activity: Not on file   Lifestyle    Physical activity:     Days per week: Not on file     Minutes per session: Not on file    Stress: Not on file   Relationships    Social connections:     Talks on phone: Not on file     Gets together: Not on file     Attends Mu-ism service: Not on file     Active member of club or organization: Not on file     Attends meetings of clubs or organizations: Not on file     Relationship status: Not on file    Intimate partner violence:     Fear of current or ex partner: Not on file     Emotionally abused: Not on file     Physically abused: Not on file     Forced sexual activity: Not on file   Other Topics Concern    Not on file   Social History Narrative    Not on file       Family History:    Family History   Problem Relation Age of Onset    Diabetes Father     Kidney Disease Father     Cancer Father        REVIEW OF SYSTEMS:  All systems reviewed and negative except for that already noted in the HPI. Physical Exam:      This a 60 y.o. female   No data found. Constitutional: Patient in no acute distress. Neuro: Alert and oriented to person, place and time. Psych: mood and affect normal  HEENT negative  Lungs: Respiratory effort is normal  Cardiovascular: Normal peripheral pulses  Abdomen: Soft, non-tender, non-distended with no CVA, flank pain or hepatosplenomegaly. No hernias. Kidneys normal.  Lymphatics: No palpable lymphadenopathy. Bladder non-tender and not distended. Pelvic exam:  External genitalia normal  Urethral and urethral meatus normal  Vagina normal with no evidence of pelvic prolapse  Uterus normal  Adnexa normal  Anus and perineum normal  Rectal exam not indicated    LABS:   No results for input(s): WBC, HGB, HCT, MCV, PLT in the last 72 hours. No results for input(s): NA, K, CL, CO2, PHOS, BUN, CREATININE in the last 72 hours.     Invalid input(s): CA    Additional Lab/culture results:    Urinalysis: No results for input(s): COLORU, PHUR, LABCAST, WBCUA, RBCUA, MUCUS, TRICHOMONAS, YEAST, BACTERIA, CLARITYU, SPECGRAV, LEUKOCYTESUR, UROBILINOGEN, [place] in the last 72 hours.     Invalid input(s): NITRATE, GLUCOSEUKETONESUAMORPHOUS     -----------------------------------------------------------------  Imaging Results:      Assessment and Plan   Impression:    Patient Active Problem List   Diagnosis    Renal colic    Constipation    Renal stones       Plan:   Right NILA Mcclendon  9:57 AM 5/21/2019

## 2019-05-21 NOTE — PROGRESS NOTES
Patient requests to use bathroom; transfers self from cart; gait steady with stand-by assist; voids w/o difficulty blood tinged urine; pt. C/o \"feels like stent is out\"; stent string remains taped to rt. Thigh and no stent noted from urethral opening.

## 2019-05-21 NOTE — PROGRESS NOTES
Patient states having spasms in rt. Kidney; pain is better between spasms; states she is ready to go home.

## 2019-05-21 NOTE — ANESTHESIA PRE PROCEDURE
Department of Anesthesiology  Preprocedure Note       Name:  Ivette Emerson   Age:  60 y.o.  :  1960                                          MRN:  [id number]         Date:  2019      Surgeon: Eric Garza):  Nuzhat Henderson MD    Procedure: CYSTOSCOPY URETEROSCOPY LASER-WITH HLL (Right )    Medications prior to admission:   Prior to Admission medications    Medication Sig Start Date End Date Taking? Authorizing Provider   polyethylene glycol (GLYCOLAX) packet Take 17 g by mouth daily    Historical Provider, MD   ibuprofen (ADVIL) 200 MG CAPS Take 1 capsule by mouth as needed for Fever    Historical Provider, MD   levothyroxine (SYNTHROID) 88 MCG tablet Take 88 mcg by mouth Daily. Historical Provider, MD       Current medications:    Current Facility-Administered Medications   Medication Dose Route Frequency Provider Last Rate Last Dose    ciprofloxacin (CIPRO) IVPB 400 mg  400 mg Intravenous On Call to Emma Gibbs MD        lactated ringers infusion   Intravenous Continuous Nuzhat Henderson MD        dimenhyDRINATE (DRAMAMINE) tablet 50 mg  50 mg Oral Once Nuzhat Henderson MD        acetaminophen (TYLENOL) tablet 650 mg  650 mg Oral Once Nuzhat Henderson MD           Allergies:     Allergies   Allergen Reactions    Bactrim [Sulfamethoxazole-Trimethoprim] Anaphylaxis    Macrobid [Nitrofurantoin Monohyd Macro] Anaphylaxis       Problem List:    Patient Active Problem List   Diagnosis Code    Renal colic H62    Constipation K59.00    Renal stones N20.0    Ureteral calculus N20.1       Past Medical History:        Diagnosis Date    Pineal hyperplasia, 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702 m)                                              BP Readings from Last 3 Encounters:   05/21/19 (!) 160/97   05/21/19 106/82   05/08/19 110/80       NPO Status: Time of last liquid consumption: 0830                        Time of last solid consumption: 2000                        Date of last liquid consumption: 05/21/19                        Date of last solid food consumption: 05/20/19    BMI:   Wt Readings from Last 3 Encounters:   05/21/19 184 lb (83.5 kg)   05/21/19 184 lb (83.5 kg)   05/08/19 181 lb (82.1 kg)     Body mass index is 28.82 kg/m². CBC:   Lab Results   Component Value Date    WBC 6.7 12/01/2013    RBC 4.33 12/01/2013    HGB 12.6 12/01/2013    HCT 37.3 12/01/2013    MCV 86.2 12/01/2013    RDW 13.4 12/01/2013     12/01/2013       CMP:   Lab Results   Component Value Date     05/21/2019    K 4.5 05/21/2019     05/21/2019    CO2 27 05/21/2019    BUN 23 05/21/2019    CREATININE 0.85 05/21/2019    GFRAA >60 05/21/2019    LABGLOM >60 05/21/2019    GLUCOSE 103 05/21/2019    PROT 6.4 12/01/2013    CALCIUM 9.6 05/21/2019    BILITOT 0.40 12/01/2013    ALKPHOS 138 12/01/2013    AST 25 12/01/2013    ALT 39 12/01/2013       POC Tests: No results for input(s): POCGLU, POCNA, POCK, POCCL, POCBUN, POCHEMO, POCHCT in the last 72 hours.     Coags: No results found for: PROTIME, INR, APTT    HCG (If Applicable): No results found for: PREGTESTUR, PREGSERUM, HCG, HCGQUANT     ABGs: No results found for: PHART, PO2ART, VZL1III, VZL0VLY, BEART, [id number]     Type & Screen (If Applicable):  No results found for: LABABO, [address]    Anesthesia Evaluation  Patient summary reviewed and Nursing

## 2019-05-21 NOTE — ANESTHESIA POSTPROCEDURE EVALUATION
Department of Anesthesiology  Postprocedure Note    Patient: Katina Keith  MRN: [id number]  YOB: 1960  Date of evaluation: 5/21/2019  Time:  5:07 PM     Procedure Summary     Date:  05/21/19 Room / Location:  [facility]    Anesthesia Start:  6407 Anesthesia Stop:  1623    Procedures:       CYSTOSCOPY URETEROSCOPY LASER-WITH HLL (Right )      CYSTOSCOPY URETERAL STENT INSERTION Diagnosis:  (KIDNEY STONES)    Surgeon:  Saige Shannon MD Responsible Provider:  NAHOMY Fisher CRNA    Anesthesia Type:  general ASA Status:  2          Anesthesia Type: general    Parag Phase I: Parag Score: 10    Parag Phase II:      Last vitals: Reviewed and per EMR flowsheets. Anesthesia Post Evaluation    Patient location during evaluation: PACU (Phase II)  Patient participation: complete - patient participated  Level of consciousness: awake and alert  Pain score: 6  Airway patency: patent  Nausea & Vomiting: no nausea and no vomiting  Complications: no  Cardiovascular status: blood pressure returned to baseline  Respiratory status: acceptable and room air  Hydration status: stable  Comments: Patient expressed concern that ureteral stent may be displaced after up to bathroom. RN checked and states there is a lot more external string present than usual.  Doretha Ahumada was notified and ordered KUB. No surgical expectations. If stent displaced, will be removed by pulling on string. Patient feels discomfort in perineum and flank areas.   Oral narcotic being administered by PACU RN

## 2019-05-21 NOTE — PROGRESS NOTES
HPI:    Patient is a 60 y.o. female in no acute distress. She is alert and oriented to person, place, and time. History  Referral from Dr. Chary Acosta for flank pain. Patient has had intermittent flank pain for several years. She does have a history of stones, has never had surgery. She spends a significant amount of time in [place] and has seen a urologist in [place] in the past.  She denies gross hematuria, dysuria, frequency, nocturia, urgency, or incontinence. She does report significant constipation, and has noticed worsening flank and abdominal pain with worsening bowel issues. She will go several days without a bowel movement. She denies history of frequent urinary tract infections. She did have a CT scan performed in 2013 that did show nonobstructing bilateral renal stones. 11/2018 CT showed bilateral punctate, nonobstructing stones. Recent urine culture was negative for infection, but did show 2-5 rbc/hpf. She is taking MiraLAX daily for her bowels. She denies any flank or abdominal pain, dysuria or gross hematuria. Previous urology records summarized below:  12/2013 CT urogram showed 2 x 4 distal ureteral stone, no filling defects to suggest malignancy. Cystoscopy was negative. She did do a 24-hour urine collection on 2/2014 that showed extreme hypercalciuria, borderline low urine pH, and mild hyperuricosuira. She was started on hydrochlorothiazide 12.5 mg daily. Today  Here today due to complaints of right groin pain and urgency. We did see patient in the office last week due to complaints of suprapubic pain. We did mutually agreed that it was secondary to constipation because she stopped taking her MiraLAX. She did send us a message through my chart with complaints of worsening pain and urgency. We did order a CT stone protocol.   This film was independently reviewed, and reviewed with Dr. Zeina Dubois, and compared to prior CT from 11/2018 and shows a 5 mm distal right ureteral stone without hydroureteronephrosis. She does have bilateral nonobstructing stones. Her current pain is 6/10. She is afebrile. She has had a few sips of water this morning, but nothing to eat since 9:00 last night. Urine culture from 5/8/19 was negative for infection or significant hematuria. Past Medical History:   Diagnosis Date    Pineal hyperplasia, 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 ([facility] [address]     Thyroid disease      Past Surgical History:   Procedure Laterality Date    BACK SURGERY      HYSTERECTOMY      KNEE SURGERY      bilat x 5     Outpatient Encounter Medications as of 5/21/2019   Medication Sig Dispense Refill    polyethylene glycol (GLYCOLAX) packet Take 17 g by mouth daily      ibuprofen (ADVIL) 200 MG CAPS Take 1 capsule by mouth as needed for Fever      levothyroxine (SYNTHROID) 88 MCG tablet Take 88 mcg by mouth Daily. No facility-administered encounter medications on file as of 5/21/2019. Current Outpatient Medications on File Prior to Visit   Medication Sig Dispense Refill    polyethylene glycol (GLYCOLAX) packet Take 17 g by mouth daily      ibuprofen (ADVIL) 200 MG CAPS Take 1 capsule by mouth as needed for Fever      levothyroxine (SYNTHROID) 88 MCG tablet Take 88 mcg by mouth Daily. No current facility-administered medications on file prior to visit. Bactrim [sulfamethoxazole-trimethoprim] and Macrobid [nitrofurantoin monohyd macro]  Family History   Problem Relation Age of Onset    Diabetes Father     Kidney Disease Father     Cancer Father      Social History     Tobacco Use   Smoking Status Never Smoker   Smokeless Tobacco Never Used       Social History     Substance and Sexual Activity   Alcohol Use No       Review of Systems   Constitutional: Negative for appetite change, chills and fever. Eyes: Negative for pain, redness and visual disturbance. Respiratory: Negative for cough, shortness of breath and wheezing. Cardiovascular: Negative for chest pain and leg swelling. Gastrointestinal: Negative for abdominal pain, constipation, nausea and vomiting. Genitourinary: Positive for flank pain and urgency. Negative for difficulty urinating, dysuria, frequency, hematuria, pelvic pain, vaginal bleeding and vaginal discharge. Musculoskeletal: Negative for back pain, joint swelling and myalgias. Skin: Negative for color change, rash and wound. Neurological: Negative for dizziness, tremors and numbness. Hematological: Negative for adenopathy. Does not bruise/bleed easily. /82 (Site: Right Upper Arm, Position: Sitting, Cuff Size: Medium Adult)   Temp 97.9 °F (36.6 °C) (Oral)   Wt 184 lb (83.5 kg)   LMP  (LMP Unknown)   BMI 28.82 kg/m²       PHYSICAL EXAM:  Constitutional: Patient resting comfortably, in no acute distress. Neuro: Alert and oriented to person place and time. Cranial nerves grossly intact. Psych: Mood and affect normal.  Skin: Warm, dry  HEENT: normocephalic, atraumatic  Lymphatics: No palpable lymphadenopathy  Lungs: Respiratory effort normal, unlabored  Cardiovascular:  Normal peripheral pulses  Abdomen: Soft, non-tender, non-distended with no organomegaly or palpable masses. : No CVA tenderness. Bladder non-tender and not distended. Pelvic: Deferred    Lab Results   Component Value Date    BUN 24 (H) 12/01/2013     Lab Results   Component Value Date    CREATININE 0.92 12/01/2013       ASSESSMENT:   Diagnosis Orders   1. Ureteral stone  Basic Metabolic Panel    EKG 12 Lead   2. Pre-op testing  Basic Metabolic Panel    EKG 12 Lead           PLAN:  Discussed risks and benefits of HLL and stent placement (including anesthesia, bleeding, infection, injury to  tract, need for multiple procedures, and the risk of major complications such as ureteral perforation). We discussed the details of the procedure.   We discussed that if Dr. Blanca Rendon is unable to pass the scope or the stone appears infectious he will only place a ureteral stent today, then she will be brought back for definitive stone therapy. We did discuss what to expect post-operatively to include: hematuria for up to two weeks, stent pain, and pain after stent removal. Patient amenable to schedule RIGHT HLL. Patient did report that she was not happy with how late her surgery time was or the fact that she has to have surgery. I did give her the option of being transferred to a tertiary center, but she declined.

## 2019-05-22 ENCOUNTER — OFFICE VISIT (OUTPATIENT)
Dept: UROLOGY | Age: 59
End: 2019-05-22
Payer: COMMERCIAL

## 2019-05-22 VITALS
HEIGHT: 67 IN | DIASTOLIC BLOOD PRESSURE: 74 MMHG | RESPIRATION RATE: 99 BRPM | HEART RATE: 62 BPM | BODY MASS INDEX: 27.62 KG/M2 | WEIGHT: 176 LBS | SYSTOLIC BLOOD PRESSURE: 111 MMHG | TEMPERATURE: 98.6 F

## 2019-05-22 DIAGNOSIS — N20.0 RENAL STONES: Primary | ICD-10-CM

## 2019-05-22 PROCEDURE — G8427 DOCREV CUR MEDS BY ELIG CLIN: HCPCS | Performed by: UROLOGY

## 2019-05-22 PROCEDURE — 3017F COLORECTAL CA SCREEN DOC REV: CPT | Performed by: UROLOGY

## 2019-05-22 PROCEDURE — 1036F TOBACCO NON-USER: CPT | Performed by: UROLOGY

## 2019-05-22 PROCEDURE — G8419 CALC BMI OUT NRM PARAM NOF/U: HCPCS | Performed by: UROLOGY

## 2019-05-22 PROCEDURE — 99213 OFFICE O/P EST LOW 20 MIN: CPT | Performed by: UROLOGY

## 2019-05-22 ASSESSMENT — ENCOUNTER SYMPTOMS
EYE PAIN: 0
EYE REDNESS: 0
COLOR CHANGE: 0
COUGH: 0
ABDOMINAL PAIN: 0
SHORTNESS OF BREATH: 0
WHEEZING: 0
BACK PAIN: 0
VOMITING: 0
NAUSEA: 0

## 2019-05-22 NOTE — PROGRESS NOTES
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 ([facility] [address]     Thyroid disease      Past Surgical History:   Procedure Laterality Date    BACK SURGERY      CYSTOSCOPY Right 5/21/2019    CYSTOSCOPY URETEROSCOPY LASER-WITH HLL performed by Swathi Marcus MD at [address]  5/21/2019    CYSTOSCOPY URETERAL STENT INSERTION performed by Swathi Marcus MD at [address] x 5    LITHOTRIPSY Right 05/21/2019    Cysto, HLL, stent placement     Outpatient Encounter Medications as of 5/22/2019   Medication Sig Dispense Refill    HYDROcodone-acetaminophen (NORCO) 5-325 MG per tablet Take 1 tablet by mouth every 4 hours as needed for Pain for up to 3 days. Intended supply: 3 days. Take lowest dose possible to manage pain 18 tablet 0    polyethylene glycol (GLYCOLAX) packet Take 17 g by mouth daily      ibuprofen (ADVIL) 200 MG CAPS Take 1 capsule by mouth as needed for Fever      levothyroxine (SYNTHROID) 88 MCG tablet Take 88 mcg by mouth Daily. No facility-administered encounter medications on file as of 5/22/2019. Current Outpatient Medications on File Prior to Visit   Medication Sig Dispense Refill    HYDROcodone-acetaminophen (NORCO) 5-325 MG per tablet Take 1 tablet by mouth every 4 hours as needed for Pain for up to 3 days. Intended supply: 3 days. Take lowest dose possible to manage pain 18 tablet 0    polyethylene glycol (GLYCOLAX) packet Take 17 g by mouth daily      ibuprofen (ADVIL) 200 MG CAPS Take 1 capsule by mouth as needed for Fever      levothyroxine (SYNTHROID) 88 MCG tablet Take 88 mcg by mouth Daily. No current facility-administered medications on file prior to visit.       Bactrim [sulfamethoxazole-trimethoprim] and Macrobid [nitrofurantoin monohyd macro]  Family History   Problem Relation Age of Onset    Diabetes Father     Kidney Disease Father     Cancer Father      Social History     Tobacco Use   Smoking Status Never Smoker   Smokeless Tobacco Never Used       Social History     Substance and Sexual Activity   Alcohol Use No       Review of Systems   Constitutional: Negative for appetite change, chills and fever. Eyes: Negative for pain, redness and visual disturbance. Respiratory: Negative for cough, shortness of breath and wheezing. Cardiovascular: Negative for chest pain and leg swelling. Gastrointestinal: Negative for abdominal pain, nausea and vomiting. Genitourinary: Positive for hematuria and urgency. Negative for difficulty urinating, dysuria, flank pain, frequency, pelvic pain, vaginal bleeding and vaginal discharge. Musculoskeletal: Negative for back pain, joint swelling and myalgias. Skin: Negative for color change, rash and wound. Neurological: Negative for dizziness, tremors and numbness. Hematological: Negative for adenopathy. Does not bruise/bleed easily. /74   Pulse 62   Temp 98.6 °F (37 °C) (Tympanic)   Resp (!) 99   Ht 5' 7\" (1.702 m)   Wt 176 lb (79.8 kg)   LMP  (LMP Unknown)   Breastfeeding? No   BMI 27.57 kg/m²       PHYSICAL EXAM:  Constitutional: Patient resting comfortably, in no acute distress. Neuro: Alert and oriented to person place and time. Cranial nerves grossly intact. Psych: Mood and affect normal.  Skin: Warm, dry  HEENT: normocephalic, atraumatic  Lymphatics: No palpable lymphadenopathy  Lungs: Respiratory effort normal, unlabored  Cardiovascular:  Normal peripheral pulses  Abdomen: Soft, non-tender, non-distended with no organomegaly or palpable masses. : No CVA tenderness. Bladder non-tender and not distended. Pelvic: deferred    Lab Results   Component Value Date    BUN 23 (H) 05/21/2019     Lab Results   Component Value Date    CREATININE 0.85 05/21/2019       ASSESSMENT:  This is a 60 y.o. female with the following diagnoses:   Diagnosis Orders   1.  Renal stones  XR ABDOMEN (KUB) (SINGLE AP VIEW)         PLAN:  We will plan for repeat KUB in 6 weeks. Patient is aware that she will have some gross hematuria or dysuria.

## 2019-05-22 NOTE — PROGRESS NOTES
Pt had ureteral stent placed on 05/21/2019 following right HLL. Stent removed via string in office today without difficulty. Pt tolerated removal well.

## 2019-05-22 NOTE — PATIENT INSTRUCTIONS
SURVEY:    You may be receiving a survey from [employer] regarding your visit today. Please complete the survey to enable us to provide the highest quality of care to you and your family. If you cannot score us a very good on any question, please call the office to discuss how we could have made your experience a very good one. Thank you.

## 2019-05-23 NOTE — OP NOTE
[address], [place] [address]                                OPERATIVE REPORT    PATIENT NAME: Abbe Merchant                  :        1960  MED REC NO:   [id number]                              ROOM:  ACCOUNT NO:   [de-identified]                           ADMIT DATE: 2019  PROVIDER:     Fermin Tidwell    DATE OF PROCEDURE:  2019    PREOPERATIVE DIAGNOSIS:  Right ureteral calculus. POSTOPERATIVE DIAGNOSIS:  Right ureteral calculus. PROCEDURES PERFORMED:  Cystoscopy, right ureteroscopy, right holmium  laser lithotripsy, right ureteral stent placement. SURGEON:  Dr. Fermin Tidwell. ASSISTANT:  None. ANESTHESIA:  General.    COMPLICATIONS:  None. ESTIMATED BLOOD LOSS:  Minimal.    SPECIMEN:  Stone for analysis. FINDINGS:  5-mm distal right ureteral calculus. INDICATIONS:  The patient is a 62-year-old female with CT showing distal  right calculus, here now for definitive therapy. DESCRIPTION OF PROCEDURE:  The patient was taken back to the operating  room after informed consent including all risks, benefits, and  alternatives were obtained. The patient was transferred from the [facility]  onto the operating table where she was induced under general anesthesia,  given IV Cipro for preoperative antibiotic prophylaxis. To begin the  case, she was prepped and draped in normal sterile fashion, placed in  dorsal lithotomy. She had a 22-Sammarinese sheath with a 30-degree lens  passed through the urethra into the bladder. Once in the bladder, we  identified the right ureteral orifice. A 0.035-inch wire was passed up,  met with some resistance. It was able to be passed up into the kidney,  confirmed via fluoroscopy. Once this was done, we were able to pass a  semirigid ureteroscope through the urethra. Once in the bladder, we  identified the right ureteral orifice.   We were able to pass the  semirigid ureteroscope up. We identified the stone in the distal  ureter, approximately 5 mm. We then used 365 micron laser fiber and ablated the stone. We were able  to pass a stone basket up. We removed several fragments. We did pass  one of these fragments to the back table for permanent analysis. Once  this was done, no significant fragments persisted. We then placed a  Glidewire up the kidney and removed the ureteroscope leaving the  Glidewire in place, placed a 6-Tamazight 26 cm double J ureteral stent with  the Glidewire up in the kidney. Glidewire was removed. Proximal curl  was confirmed by fluoroscopy. Distal curl was confirmed by  visualization. At this point in time, stent with string was attached to  the right thigh with Steris and benzoin. She was awoken from general  anesthesia, transferred to the [facility], and taken to the PACU in  satisfactory condition by Nursing and Anesthesia teams. PLAN:  The patient will be discharged home per PACU criterion, will  follow up with us in two days for stent removal via string.         Chace Geronimo    D: 05/22/2019 12:11:23       T: 05/22/2019 15:09:13     [id number]  Job#: [id number]     Doc#: [id number]    CC:

## 2019-05-24 LAB
STONE COMPOSITION: NORMAL
STONE DESCRIPTION: NORMAL
STONE MASS: 20 MG
STONE NUMBER: 2
STONE SIZE: NORMAL MM

## 2019-07-16 ENCOUNTER — HOSPITAL ENCOUNTER (OUTPATIENT)
Age: 59
Discharge: HOME | End: 2019-07-16
Payer: COMMERCIAL

## 2019-07-16 ENCOUNTER — HOSPITAL ENCOUNTER (OUTPATIENT)
Dept: GENERAL RADIOLOGY | Age: 59
Discharge: HOME | End: 2019-07-18
Payer: COMMERCIAL

## 2019-07-16 DIAGNOSIS — N20.0 RENAL STONES: ICD-10-CM

## 2019-07-16 PROCEDURE — 74018 RADEX ABDOMEN 1 VIEW: CPT

## 2019-07-18 ENCOUNTER — OFFICE VISIT (OUTPATIENT)
Dept: UROLOGY | Age: 59
End: 2019-07-18
Payer: COMMERCIAL

## 2019-07-18 VITALS
BODY MASS INDEX: 27.47 KG/M2 | HEIGHT: 67 IN | SYSTOLIC BLOOD PRESSURE: 110 MMHG | WEIGHT: 175 LBS | DIASTOLIC BLOOD PRESSURE: 80 MMHG

## 2019-07-18 DIAGNOSIS — N20.0 RENAL STONES: Primary | ICD-10-CM

## 2019-07-18 DIAGNOSIS — N20.1 URETERAL STONE: ICD-10-CM

## 2019-07-18 PROCEDURE — 3017F COLORECTAL CA SCREEN DOC REV: CPT | Performed by: UROLOGY

## 2019-07-18 PROCEDURE — G8419 CALC BMI OUT NRM PARAM NOF/U: HCPCS | Performed by: UROLOGY

## 2019-07-18 PROCEDURE — 1036F TOBACCO NON-USER: CPT | Performed by: UROLOGY

## 2019-07-18 PROCEDURE — 99213 OFFICE O/P EST LOW 20 MIN: CPT | Performed by: UROLOGY

## 2019-07-18 PROCEDURE — G8427 DOCREV CUR MEDS BY ELIG CLIN: HCPCS | Performed by: UROLOGY

## 2019-07-18 RX ORDER — ROSUVASTATIN CALCIUM 5 MG/1
5 TABLET, COATED ORAL DAILY
COMMUNITY
End: 2020-10-28

## 2019-07-18 ASSESSMENT — ENCOUNTER SYMPTOMS
VOMITING: 0
EYE PAIN: 0
SHORTNESS OF BREATH: 0
EYE REDNESS: 0
COUGH: 0
WHEEZING: 0
COLOR CHANGE: 0
BACK PAIN: 0
NAUSEA: 0
ABDOMINAL PAIN: 0

## 2019-08-28 ENCOUNTER — HOSPITAL ENCOUNTER (OUTPATIENT)
Age: 59
Discharge: HOME | End: 2019-08-28
Payer: COMMERCIAL

## 2019-08-28 LAB
ALT SERPL-CCNC: 21 U/L (ref 5–33)
AST SERPL-CCNC: 22 U/L
CHOLESTEROL/HDL RATIO: 3.1
CHOLESTEROL: 183 MG/DL
HDLC SERPL-MCNC: 60 MG/DL
INSULIN COMMENT: 755
INSULIN REFERENCE RANGE:: NORMAL
INSULIN: 20.1 MU/L
LDL CHOLESTEROL: 105 MG/DL (ref 0–130)
TRIGL SERPL-MCNC: 90 MG/DL
VLDLC SERPL CALC-MCNC: NORMAL MG/DL (ref 1–30)

## 2019-08-28 PROCEDURE — 83525 ASSAY OF INSULIN: CPT

## 2019-08-28 PROCEDURE — 84460 ALANINE AMINO (ALT) (SGPT): CPT

## 2019-08-28 PROCEDURE — 84450 TRANSFERASE (AST) (SGOT): CPT

## 2019-08-28 PROCEDURE — 80061 LIPID PANEL: CPT

## 2019-08-28 PROCEDURE — 36415 COLL VENOUS BLD VENIPUNCTURE: CPT

## 2020-05-13 ENCOUNTER — HOSPITAL ENCOUNTER (OUTPATIENT)
Dept: GENERAL RADIOLOGY | Age: 60
Discharge: HOME | End: 2020-05-15
Payer: COMMERCIAL

## 2020-05-13 ENCOUNTER — HOSPITAL ENCOUNTER (OUTPATIENT)
Age: 60
Discharge: HOME | End: 2020-05-15
Payer: COMMERCIAL

## 2020-05-13 PROCEDURE — 73630 X-RAY EXAM OF FOOT: CPT

## 2020-07-01 ENCOUNTER — HOSPITAL ENCOUNTER (OUTPATIENT)
Dept: WOMENS IMAGING | Age: 60
Discharge: HOME | End: 2020-07-03
Payer: COMMERCIAL

## 2020-07-01 PROCEDURE — 77063 BREAST TOMOSYNTHESIS BI: CPT

## 2020-07-14 ENCOUNTER — HOSPITAL ENCOUNTER (OUTPATIENT)
Age: 60
Setting detail: SPECIMEN
Discharge: HOME | End: 2020-07-14
Payer: COMMERCIAL

## 2020-07-14 ENCOUNTER — OFFICE VISIT (OUTPATIENT)
Dept: UROLOGY | Age: 60
End: 2020-07-14
Payer: COMMERCIAL

## 2020-07-14 ENCOUNTER — HOSPITAL ENCOUNTER (OUTPATIENT)
Dept: CT IMAGING | Age: 60
Discharge: HOME | End: 2020-07-16
Payer: COMMERCIAL

## 2020-07-14 VITALS
SYSTOLIC BLOOD PRESSURE: 120 MMHG | DIASTOLIC BLOOD PRESSURE: 78 MMHG | TEMPERATURE: 96.9 F | WEIGHT: 178 LBS | BODY MASS INDEX: 27.88 KG/M2

## 2020-07-14 LAB
-: ABNORMAL
AMORPHOUS: ABNORMAL
BACTERIA: ABNORMAL
BILIRUBIN URINE: NEGATIVE
CASTS UA: ABNORMAL /LPF
COLOR: YELLOW
COMMENT UA: ABNORMAL
CRYSTALS, UA: ABNORMAL /HPF
EPITHELIAL CELLS UA: ABNORMAL /HPF (ref 0–25)
GLUCOSE URINE: NEGATIVE
KETONES, URINE: NEGATIVE
LEUKOCYTE ESTERASE, URINE: NEGATIVE
MUCUS: ABNORMAL
NITRITE, URINE: NEGATIVE
OTHER OBSERVATIONS UA: ABNORMAL
PH UA: 6 (ref 5–9)
PROTEIN UA: NEGATIVE
RBC UA: ABNORMAL /HPF (ref 0–2)
RENAL EPITHELIAL, UA: ABNORMAL /HPF
SPECIFIC GRAVITY UA: 1.02 (ref 1.01–1.02)
TRICHOMONAS: ABNORMAL
TURBIDITY: CLEAR
URINE HGB: NEGATIVE
UROBILINOGEN, URINE: NORMAL
WBC UA: ABNORMAL /HPF (ref 0–5)
YEAST: ABNORMAL

## 2020-07-14 PROCEDURE — 99214 OFFICE O/P EST MOD 30 MIN: CPT | Performed by: PHYSICIAN ASSISTANT

## 2020-07-14 PROCEDURE — 74176 CT ABD & PELVIS W/O CONTRAST: CPT

## 2020-07-14 PROCEDURE — 81001 URINALYSIS AUTO W/SCOPE: CPT

## 2020-07-14 PROCEDURE — 87086 URINE CULTURE/COLONY COUNT: CPT

## 2020-07-14 RX ORDER — PHENTERMINE HYDROCHLORIDE 37.5 MG/1
TABLET ORAL
COMMUNITY
Start: 2020-06-23 | End: 2021-06-24

## 2020-07-14 ASSESSMENT — ENCOUNTER SYMPTOMS
SHORTNESS OF BREATH: 0
BACK PAIN: 0
ABDOMINAL PAIN: 0
VOMITING: 0
EYE REDNESS: 0
EYE PAIN: 0
WHEEZING: 0
COUGH: 0
COLOR CHANGE: 0
NAUSEA: 0
CONSTIPATION: 0

## 2020-07-14 NOTE — PATIENT INSTRUCTIONS
STONE PREVENTION    To prevent future stone formation:    1) DO drink ~65-80 oz (2-2.5L) of water per day to stay adequately hydrated     2) AVOID/LIMIT intake of \"bad fluids\": soda/pop, coffee, tea, alcohol, energy drinks, any beverage with caffeine can act to dehydrate you       \"BAD FLUIDS\" DO NOT COUNT TOWARD THE 65-80oz of water    3) REDUCE consumption of sodium/salt - DO NOT salt your food, read labels (lunch meats, canned soups, prepared meals are high in salt), choose low salt options    4) DO NOT EAT animal protein/meat more than 2 meals a day - this includes red meat, pork, poultry and fish

## 2020-07-15 LAB
CULTURE: NORMAL
Lab: NORMAL
SPECIMEN DESCRIPTION: NORMAL

## 2020-07-16 ENCOUNTER — TELEPHONE (OUTPATIENT)
Dept: UROLOGY | Age: 60
End: 2020-07-16

## 2020-07-16 NOTE — TELEPHONE ENCOUNTER
----- Message from NAHOMY Almaraz - CNP sent at 7/16/2020  8:49 AM EDT -----  Call pt - urine cx reviewed and negative for UTI & for significant microhematuria

## 2020-10-20 ENCOUNTER — OFFICE VISIT (OUTPATIENT)
Dept: OBGYN | Age: 60
End: 2020-10-20
Payer: COMMERCIAL

## 2020-10-20 VITALS
HEIGHT: 68 IN | BODY MASS INDEX: 27.13 KG/M2 | SYSTOLIC BLOOD PRESSURE: 124 MMHG | DIASTOLIC BLOOD PRESSURE: 74 MMHG | WEIGHT: 179 LBS

## 2020-10-20 PROCEDURE — 99396 PREV VISIT EST AGE 40-64: CPT | Performed by: ADVANCED PRACTICE MIDWIFE

## 2020-10-20 RX ORDER — ATORVASTATIN CALCIUM 10 MG/1
TABLET, FILM COATED ORAL
COMMUNITY
Start: 2020-09-24 | End: 2021-09-01

## 2020-10-20 ASSESSMENT — PATIENT HEALTH QUESTIONNAIRE - PHQ9
SUM OF ALL RESPONSES TO PHQ QUESTIONS 1-9: 0
SUM OF ALL RESPONSES TO PHQ QUESTIONS 1-9: 0
2. FEELING DOWN, DEPRESSED OR HOPELESS: 0
1. LITTLE INTEREST OR PLEASURE IN DOING THINGS: 0
SUM OF ALL RESPONSES TO PHQ QUESTIONS 1-9: 0
SUM OF ALL RESPONSES TO PHQ9 QUESTIONS 1 & 2: 0

## 2020-10-20 NOTE — PROGRESS NOTES
YEARLY PHYSICAL    Date of service: 10/20/2020    Theressa Barthel  Is a 63 y.o.   female    PT's PCP is: Danielle Garcia II     : 1960                                             Subjective:       No LMP recorded (lmp unknown). Patient has had a hysterectomy. Are your menses regular: not applicable    OB History    Para Term  AB Living   2         2   SAB TAB Ectopic Molar Multiple Live Births                    # Outcome Date GA Lbr Levi/2nd Weight Sex Delivery Anes PTL Lv   2             1                  Social History     Tobacco Use   Smoking Status Never Smoker   Smokeless Tobacco Never Used        Social History     Substance and Sexual Activity   Alcohol Use No       Family History   Problem Relation Age of Onset    Diabetes Father     Kidney Disease Father     Cancer Father     Other Other         No family h/o ovarian or breast cancer.  Deep Vein Thrombosis Brother        Any family history of breast or ovarian cancer: No    Any family history of blood clots: Yes      Allergies: Bactrim [sulfamethoxazole-trimethoprim] and Macrobid [nitrofurantoin monohyd macro]      Current Outpatient Medications:     atorvastatin (LIPITOR) 10 MG tablet, , Disp: , Rfl:     polyethylene glycol (GLYCOLAX) packet, Take 17 g by mouth daily, Disp: , Rfl:     ibuprofen (ADVIL) 200 MG CAPS, Take 1 capsule by mouth as needed for Fever, Disp: , Rfl:     levothyroxine (SYNTHROID) 88 MCG tablet, Take 88 mcg by mouth Daily. , Disp: , Rfl:     metFORMIN (GLUCOPHAGE) 500 MG tablet, Take 1,000 mg by mouth 2 times daily, Disp: , Rfl:     phentermine (ADIPEX-P) 37.5 MG tablet, Take 1/2 tablet daily, Disp: , Rfl:     rosuvastatin (CRESTOR) 5 MG tablet, Take 5 mg by mouth daily, Disp: , Rfl:     Social History     Substance and Sexual Activity   Sexual Activity Yes    Partners: Male       Any bleeding or pain with intercourse: No    Last Yearly:  2019    Last pap: 2019    Last HPV: ?    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  KNEE SURGERY      bilat x 5    LITHOTRIPSY Right 05/21/2019    Cysto, HLL, stent placement       Family History   Problem Relation Age of Onset    Diabetes Father     Kidney Disease Father     Cancer Father     Other Other         No family h/o ovarian or breast cancer.  Deep Vein Thrombosis Brother        Chief Complaint   Patient presents with    Gynecologic Exam     Yearly exam. Last pap          PE:  Vital Signs  Blood pressure 124/74, height 5' 7.5\" (1.715 m), weight 179 lb (81.2 kg), not currently breastfeeding. Estimated body mass index is 27.62 kg/m² as calculated from the following:    Height as of this encounter: 5' 7.5\" (1.715 m). Weight as of this encounter: 179 lb (81.2 kg). Labs:    No results found for this visit on 10/20/20. NURSE: C.smith LPN    HPI: here for annual gyn exam, had been on metformin from [facility] but stopped, had \"lots of side effects\" GI upset    Review of Systems   Constitutional: Negative. HENT: Negative for congestion. Respiratory: Negative for shortness of breath. Cardiovascular: Negative for chest pain. Gastrointestinal: Negative for abdominal pain. Genitourinary: Negative for dysuria. Musculoskeletal: Negative for back pain. Skin: Negative for rash. Neurological: Negative for headaches. Psychiatric/Behavioral: The patient is not nervous/anxious. Objective  Lymphatic:   no lymphadenopathy  Heent:   negative   Cor: regular rate and rhythm, no murmurs              Pul:clear to auscultation bilaterally- no wheezes, rales or rhonchi, normal air movement, no respiratory distress      GI: Abdomen soft, non-tender. BS normal. No masses,  No organomegaly           Extremities: normal strength, tone, and muscle mass   Breasts: Breast:normal appearance, no masses or tenderness   Pelvic Exam: GENITAL/URINARY:  External Genitalia:  General appearance; normal, Hair distribution; normal, Lesions absent  Urethral Meatus:  Size normal, Location normal, Lesions absent, Prolapse absent  Urethra: Fullness absent, Masses absent  Bladder:  Fullness absent, Masses absent, Tenderness absent, Cystocele absent  Vagina:  General appearance normal, Estrogen effect normal, Discharge absent, Lesions absent, Pelvic support normal  Adenexa: Masses absent, Tenderness absent  Anus/Perineum:  Lesions absent and Masses absent                                                        Assessment and Plan          Diagnosis Orders   1. Encounter for annual routine gynecological examination               I have discontinued Shawn Dienes metFORMIN. I am also having her maintain her levothyroxine, ibuprofen, polyethylene glycol, rosuvastatin, phentermine, and atorvastatin. Return in about 1 year (around 10/20/2021) for yearly. She was also counseled on her preventative health maintenance recommendations and follow-up. There are no Patient Instructions on file for this visit.     Bhavana Hooper,10/21/2020 8:10 AM

## 2020-10-21 ASSESSMENT — ENCOUNTER SYMPTOMS
ABDOMINAL PAIN: 0
BACK PAIN: 0
SHORTNESS OF BREATH: 0

## 2021-04-23 PROBLEM — U07.1 COVID-19 VIRUS INFECTION: Status: ACTIVE | Noted: 2021-04-23

## 2021-08-04 ENCOUNTER — ANCILLARY ORDERS (OUTPATIENT)
Dept: WOMENS IMAGING | Age: 61
End: 2021-08-04

## 2021-08-04 ENCOUNTER — HOSPITAL ENCOUNTER (OUTPATIENT)
Dept: WOMENS IMAGING | Age: 61
Discharge: HOME | End: 2021-08-06
Payer: COMMERCIAL

## 2021-08-04 DIAGNOSIS — Z12.31 BREAST CANCER SCREENING BY MAMMOGRAM: ICD-10-CM

## 2021-08-04 DIAGNOSIS — E78.2 MIXED HYPERLIPIDEMIA: Primary | ICD-10-CM

## 2021-08-04 PROCEDURE — 77063 BREAST TOMOSYNTHESIS BI: CPT

## 2021-09-01 ENCOUNTER — TELEPHONE (OUTPATIENT)
Dept: UROLOGY | Age: 61
End: 2021-09-01

## 2021-09-01 DIAGNOSIS — N20.0 RENAL STONES: Primary | ICD-10-CM

## 2021-09-01 RX ORDER — ATORVASTATIN CALCIUM 20 MG/1
20 TABLET, FILM COATED ORAL DAILY
Qty: 30 TABLET | Refills: 3 | Status: SHIPPED | OUTPATIENT
Start: 2021-09-01 | End: 2021-09-01 | Stop reason: SDUPTHER

## 2021-09-23 ENCOUNTER — TELEPHONE (OUTPATIENT)
Dept: UROLOGY | Age: 61
End: 2021-09-23

## 2021-09-27 PROBLEM — M25.551 CHRONIC RIGHT HIP PAIN: Status: ACTIVE | Noted: 2021-09-27

## 2021-09-27 PROBLEM — E05.90 HYPERTHYROIDISM: Status: ACTIVE | Noted: 2021-09-27

## 2021-09-27 PROBLEM — G89.29 CHRONIC RIGHT HIP PAIN: Status: ACTIVE | Noted: 2021-09-27

## 2022-04-26 ENCOUNTER — OFFICE VISIT (OUTPATIENT)
Dept: OBGYN | Age: 62
End: 2022-04-26
Payer: COMMERCIAL

## 2022-04-26 VITALS
SYSTOLIC BLOOD PRESSURE: 132 MMHG | BODY MASS INDEX: 27.43 KG/M2 | HEIGHT: 68 IN | DIASTOLIC BLOOD PRESSURE: 80 MMHG | WEIGHT: 181 LBS

## 2022-04-26 DIAGNOSIS — Z12.31 SCREENING MAMMOGRAM, ENCOUNTER FOR: ICD-10-CM

## 2022-04-26 DIAGNOSIS — L65.9 HAIR LOSS: ICD-10-CM

## 2022-04-26 DIAGNOSIS — Z01.419 ENCOUNTER FOR ANNUAL ROUTINE GYNECOLOGICAL EXAMINATION: Primary | ICD-10-CM

## 2022-04-26 PROCEDURE — 99396 PREV VISIT EST AGE 40-64: CPT | Performed by: ADVANCED PRACTICE MIDWIFE

## 2022-04-26 NOTE — PROGRESS NOTES
YEARLY PHYSICAL    Date of service: 2022    Romana Jews  Is a 57 y.o.   female    PT's PCP is: NAHOMY Meneses CNP     : 1960                                             Subjective:       No LMP recorded (lmp unknown). Patient has had a hysterectomy. Are your menses regular: no    OB History    Para Term  AB Living   2 2 2     2   SAB IAB Ectopic Molar Multiple Live Births                    # Outcome Date GA Lbr Levi/2nd Weight Sex Delivery Anes PTL Lv   2 Term            1 Term                 Social History     Tobacco Use   Smoking Status Never Smoker   Smokeless Tobacco Never Used        Social History     Substance and Sexual Activity   Alcohol Use No       Family History   Problem Relation Age of Onset    Diabetes Father     Kidney Disease Father     Cancer Father     Other Other         No family h/o ovarian or breast cancer.  Deep Vein Thrombosis Brother        Any family history of breast or ovarian cancer: No    Any family history of blood clots: No    Have you had a positive covid test: No    Have you had the covid immunization: No      Allergies: Bactrim [sulfamethoxazole-trimethoprim] and Macrobid [nitrofurantoin monohyd macro]      Current Outpatient Medications:     levothyroxine (SYNTHROID) 100 MCG tablet, Take 1 tablet by mouth daily, Disp: 30 tablet, Rfl: 5    ibuprofen (ADVIL) 200 MG CAPS, Take 1 capsule by mouth as needed for Fever, Disp: , Rfl:     atorvastatin (LIPITOR) 20 MG tablet, Take 1 tablet by mouth daily (Patient not taking: Reported on 2021), Disp: 30 tablet, Rfl: 3    Social History     Substance and Sexual Activity   Sexual Activity Yes    Partners: Male       Any bleeding or pain with intercourse: No    Last Yearly:      Last pap: 2019 ?     Last HPV: unknown    Last Mammogram:     Last Dexascan never    Last colorectal screen- type: Cologard date      Do you do self breast exams: Yes    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 ([facility] [address]     Thyroid disease        Past Surgical History:   Procedure Laterality Date    APPENDECTOMY      BACK SURGERY      CYSTOSCOPY Right 5/21/2019    CYSTOSCOPY URETEROSCOPY LASER-WITH HLL performed by Rafa Gonzalez MD at [facility] [address]  5/21/2019    CYSTOSCOPY URETERAL STENT INSERTION performed by Rafa Gonzalez MD at [address] [place] [address] Drive  [address]    D/T heavy bleeding and clots, still has ovaries.  KNEE SURGERY      bilat x 5    LITHOTRIPSY Right 05/21/2019    Cysto, HLL, stent placement       Family History   Problem Relation Age of Onset    Diabetes Father     Kidney Disease Father     Cancer Father     Other Other         No family h/o ovarian or breast cancer.  Deep Vein Thrombosis Brother        Chief Complaint   Patient presents with    Gynecologic Exam     Yearly exam. Last pap 2019 ? C/O hair loss and frequent headaches , following with PCP. PE:  Vital Signs  Blood pressure 132/80, height 5' 7.5\" (1.715 m), weight 181 lb (82.1 kg), not currently breastfeeding. Estimated body mass index is 27.93 kg/m² as calculated from the following:    Height as of this encounter: 5' 7.5\" (1.715 m). Weight as of this encounter: 181 lb (82.1 kg). Labs:    No results found for this visit on 04/26/22. No data recorded    NURSE: Manjit WALL    HPI: here for annual gyn exam, c/o hair loss around forehead    Patient reports thyroid is \"stable\" although chart review shows tsh 12/22/21 was 14.08 and most recent 4/25/22 shows 0.51    Review of Systems   Constitutional: Negative. HENT: Negative for congestion. Respiratory: Negative for shortness of breath. Cardiovascular: Negative for chest pain. Gastrointestinal: Negative for abdominal pain.    Genitourinary: Negative for dysuria. Musculoskeletal: Negative for back pain. Skin:        Hair loss   Neurological: Negative for headaches. Psychiatric/Behavioral: The patient is not nervous/anxious. Objective  Lymphatic:   no lymphadenopathy  Heent:   negative   Cor: regular rate and rhythm, no murmurs              Pul:clear to auscultation bilaterally- no wheezes, rales or rhonchi, normal air movement, no respiratory distress      GI: Abdomen soft, non-tender. BS normal. No masses,  No organomegaly           Extremities: normal strength, tone, and muscle mass   Breasts: Breast:normal appearance, no masses or tenderness   Pelvic Exam: GENITAL/URINARY:  External Genitalia:  General appearance; normal, Hair distribution; normal, Lesions absent  Urethral Meatus:  Size normal, Location normal, Lesions absent, Prolapse absent  Urethra: Fullness absent, Masses absent  Bladder:  Fullness absent, Masses absent, Tenderness absent, Cystocele absent  Vagina:  General appearance normal, Estrogen effect normal, Discharge absent, Lesions absent, Pelvic support normal  Adenexa: Masses absent, Tenderness absent  Anus/Perineum:  Lesions absent and Masses absent                                                              Assessment and Plan          Diagnosis Orders   1. Encounter for annual routine gynecological examination     2. Screening mammogram, encounter for  [facility] TON DIGITAL SCREEN BILATERAL   3. Hair loss         discussed OTC hair products and discussed derrm referral if continues although may just be normalizing after abnormality surrounding December levels      I am having Edilson Hazel maintain her ibuprofen and levothyroxine. Return in about 1 year (around 4/26/2023) for yearly. She was also counseled on her preventative health maintenance recommendations and follow-up. There are no Patient Instructions on file for this visit.     NAHOMY Gutierrez CNM,4/27/2022 10:32 PM

## 2022-04-27 PROBLEM — E78.2 MIXED HYPERLIPIDEMIA: Chronic | Status: ACTIVE | Noted: 2022-04-27

## 2022-04-27 PROBLEM — E05.90 HYPERTHYROIDISM: Chronic | Status: ACTIVE | Noted: 2021-09-27

## 2022-04-27 PROBLEM — E78.2 MIXED HYPERLIPIDEMIA: Status: ACTIVE | Noted: 2022-04-27

## 2022-04-27 ASSESSMENT — ENCOUNTER SYMPTOMS
BACK PAIN: 0
ABDOMINAL PAIN: 0
SHORTNESS OF BREATH: 0
ROS SKIN COMMENTS: HAIR LOSS
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
## 2022-06-27 ENCOUNTER — HOSPITAL ENCOUNTER (OUTPATIENT)
Age: 62
Discharge: HOME | End: 2022-06-27
Payer: COMMERCIAL

## 2022-06-27 ENCOUNTER — ANESTHESIA EVENT (OUTPATIENT)
Dept: OPERATING ROOM | Age: 62
End: 2022-06-27
Payer: COMMERCIAL

## 2022-06-27 ENCOUNTER — OFFICE VISIT (OUTPATIENT)
Dept: UROLOGY | Age: 62
End: 2022-06-27
Payer: COMMERCIAL

## 2022-06-27 ENCOUNTER — HOSPITAL ENCOUNTER (OUTPATIENT)
Age: 62
Setting detail: SPECIMEN
Discharge: HOME | End: 2022-06-27
Payer: COMMERCIAL

## 2022-06-27 VITALS
HEART RATE: 74 BPM | TEMPERATURE: 97.8 F | WEIGHT: 182 LBS | DIASTOLIC BLOOD PRESSURE: 82 MMHG | SYSTOLIC BLOOD PRESSURE: 132 MMHG | HEIGHT: 68 IN | RESPIRATION RATE: 18 BRPM | BODY MASS INDEX: 27.58 KG/M2

## 2022-06-27 DIAGNOSIS — N23 RENAL COLIC: ICD-10-CM

## 2022-06-27 DIAGNOSIS — N20.0 RENAL STONES: ICD-10-CM

## 2022-06-27 DIAGNOSIS — N20.0 RENAL STONES: Primary | ICD-10-CM

## 2022-06-27 LAB
-: ABNORMAL
ABSOLUTE EOS #: 0.13 K/UL (ref 0–0.44)
ABSOLUTE IMMATURE GRANULOCYTE: 0.06 K/UL (ref 0–0.3)
ABSOLUTE LYMPH #: 1.28 K/UL (ref 1.1–3.7)
ABSOLUTE MONO #: 0.55 K/UL (ref 0.1–1.2)
AMORPHOUS: ABNORMAL
BACTERIA: ABNORMAL
BASOPHILS # BLD: 1 % (ref 0–2)
BASOPHILS ABSOLUTE: 0.05 K/UL (ref 0–0.2)
BILIRUBIN URINE: NEGATIVE
COLOR: YELLOW
EKG ATRIAL RATE: 60 BPM
EKG P AXIS: 27 DEGREES
EKG P-R INTERVAL: 138 MS
EKG Q-T INTERVAL: 388 MS
EKG QRS DURATION: 90 MS
EKG QTC CALCULATION (BAZETT): 388 MS
EKG R AXIS: 50 DEGREES
EKG T AXIS: 12 DEGREES
EKG VENTRICULAR RATE: 60 BPM
EOSINOPHILS RELATIVE PERCENT: 2 % (ref 1–4)
EPITHELIAL CELLS UA: ABNORMAL /HPF (ref 0–25)
GLUCOSE URINE: NEGATIVE
HCT VFR BLD CALC: 41.1 % (ref 36.3–47.1)
HEMOGLOBIN: 13.3 G/DL (ref 11.9–15.1)
IMMATURE GRANULOCYTES: 1 %
KETONES, URINE: NEGATIVE
LEUKOCYTE ESTERASE, URINE: NEGATIVE
LYMPHOCYTES # BLD: 19 % (ref 24–43)
MCH RBC QN AUTO: 30.2 PG (ref 25.2–33.5)
MCHC RBC AUTO-ENTMCNC: 32.4 G/DL (ref 28.4–34.8)
MCV RBC AUTO: 93.4 FL (ref 82.6–102.9)
MONOCYTES # BLD: 8 % (ref 3–12)
NITRITE, URINE: NEGATIVE
NRBC AUTOMATED: 0 PER 100 WBC
PDW BLD-RTO: 12.2 % (ref 11.8–14.4)
PH UA: 5.5 (ref 5–9)
PLATELET # BLD: 265 K/UL (ref 138–453)
PMV BLD AUTO: 10.4 FL (ref 8.1–13.5)
PROTEIN UA: NEGATIVE
RBC # BLD: 4.4 M/UL (ref 3.95–5.11)
RBC UA: ABNORMAL /HPF (ref 0–2)
SEG NEUTROPHILS: 69 % (ref 36–65)
SEGMENTED NEUTROPHILS ABSOLUTE COUNT: 4.65 K/UL (ref 1.5–8.1)
SPECIFIC GRAVITY UA: 1.02 (ref 1.01–1.02)
TURBIDITY: ABNORMAL
URINE HGB: NEGATIVE
UROBILINOGEN, URINE: NORMAL
WBC # BLD: 6.7 K/UL (ref 3.5–11.3)
WBC UA: ABNORMAL /HPF (ref 0–5)

## 2022-06-27 PROCEDURE — 36415 COLL VENOUS BLD VENIPUNCTURE: CPT

## 2022-06-27 PROCEDURE — 85025 COMPLETE CBC W/AUTO DIFF WBC: CPT

## 2022-06-27 PROCEDURE — 81001 URINALYSIS AUTO W/SCOPE: CPT

## 2022-06-27 PROCEDURE — 93010 ELECTROCARDIOGRAM REPORT: CPT | Performed by: FAMILY MEDICINE

## 2022-06-27 PROCEDURE — 99214 OFFICE O/P EST MOD 30 MIN: CPT | Performed by: UROLOGY

## 2022-06-27 PROCEDURE — 93005 ELECTROCARDIOGRAM TRACING: CPT

## 2022-06-27 PROCEDURE — 87086 URINE CULTURE/COLONY COUNT: CPT

## 2022-06-27 NOTE — PROGRESS NOTES
HPI:        Patient is a 57 y.o. female in no acute distress. She is alert and oriented to person, place, and time. History  Referral from Dr. Timmy Alvarez for flank pain.  Patient has had intermittent flank pain for several years. Angeli Villeda does have a history of stones, has never had surgery.  She spends a significant amount of time in [place] and has seen a urologist in [place] in the past. Angeli Villeda denies gross hematuria, dysuria, frequency, nocturia, urgency, or incontinence. Angeli Villeda does report significant constipation, and has noticed worsening flank and abdominal pain with worsening bowel issues. Angeli Villeda will go several days without a bowel movement.  She denies history of frequent urinary tract infections.  She did have a CT scan performed in 2013 that did show nonobstructing bilateral renal stones.    11/2018 CT showed bilateral punctate, nonobstructing stones.  Recent urine culture was negative for infection, but did show 2-5 rbc/hpf.  She is taking MiraLAX daily for her bowels.  She denies any flank or abdominal pain, dysuria or gross hematuria.     Previous urology records summarized below:  12/2013 CT urogram showed 2 x 4 distal ureteral stone, no filling defects to suggest malignancy.  Cystoscopy was negative.     She did do a 24-hour urine collection on 2/2014 that showed extreme hypercalciuria, borderline low urine pH, and mild hyperuricosuira. Angeli Villeda was started on hydrochlorothiazide 12.5 mg daily.     5/21/2019 Right HLL    Currently  Patient is here today with flank pain x7 days. Patient did cancel her normal follow-up. Meeting last year. Patient is experiencing right flank pain x1 month. Patient does have extensive stone history. Her last CT scan did show multiple stones in both kidneys. On the right side they did measure approximately 5 mm. Patient has been through several rounds of antibiotics. These have not allowed her any relief. Patient has had no gross hematuria recently.   She did stop her current course of antibiotics approximately 2 days ago. Patient's pain is mild to moderate today. This is in the right flank but radiates mostly towards the right groin. 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 ([address]     Thyroid disease      Past Surgical History:   Procedure Laterality Date    APPENDECTOMY      BACK SURGERY      CYSTOSCOPY Right 5/21/2019    CYSTOSCOPY URETEROSCOPY LASER-WITH HLL performed [facility]  5/21/2019    CYSTOSCOPY URETERAL STENT INSERTION performed by Vishal Fontanez MD at [address] ([address] [facility])  1990's    D/T heavy bleeding and clots, still has ovaries.  KNEE SURGERY      bilat x 5    LITHOTRIPSY Right 05/21/2019    Cysto, HLL, stent placement     Outpatient Encounter Medications as of 6/27/2022   Medication Sig Dispense Refill    traZODone (DESYREL) 50 MG tablet Take 0.5 tablets by mouth nightly 30 tablet 0    amoxicillin (AMOXIL) 500 MG capsule Take 1 capsule by mouth 2 times daily for 7 days 14 capsule 0    atorvastatin (LIPITOR) 10 MG tablet Take 1 tablet by mouth daily (Patient not taking: Reported on 5/25/2022) 30 tablet 3    levothyroxine (SYNTHROID) 100 MCG tablet Take 1 tablet by mouth daily (Patient taking differently: Take 100 mcg by mouth daily Indications: patient is taking 88 mcg ) 30 tablet 5    ibuprofen (ADVIL) 200 MG CAPS Take 1 capsule by mouth as needed for Fever       No facility-administered encounter medications on file as of 6/27/2022.       Current Outpatient Medications on File Prior to Visit   Medication Sig Dispense Refill    traZODone (DESYREL) 50 MG tablet Take 0.5 tablets by mouth nightly 30 tablet 0    amoxicillin (AMOXIL) 500 MG capsule Take 1 capsule by mouth 2 times daily for 7 days 14 capsule 0    atorvastatin (LIPITOR) 10 MG tablet Take 1 tablet by mouth daily (Patient not taking: Reported on 5/25/2022) 30 tablet 3    levothyroxine (SYNTHROID) 100 MCG tablet Take 1 tablet by mouth daily (Patient taking differently: Take 100 mcg by mouth daily Indications: patient is taking 88 mcg ) 30 tablet 5    ibuprofen (ADVIL) 200 MG CAPS Take 1 capsule by mouth as needed for Fever       No current facility-administered medications on file prior to visit. Bactrim [sulfamethoxazole-trimethoprim] and Macrobid [nitrofurantoin monohyd macro]  Family History   Problem Relation Age of Onset    Diabetes Father     Kidney Disease Father     Cancer Father     Other Other         No family h/o ovarian or breast cancer.  Deep Vein Thrombosis Brother      Social History     Tobacco Use   Smoking Status Never Smoker   Smokeless Tobacco Never Used       Social History     Substance and Sexual Activity   Alcohol Use No       Review of Systems    LMP  (LMP Unknown)       PHYSICAL EXAM:  Constitutional: Patient resting comfortably, in no acute distress. Neuro: Alert and oriented to person place and time. Cranial nerves grossly intact. Psych: Mood and affect normal.  Skin: Warm, dry  HEENT: normocephalic, atraumatic  Lymphatics: No palpable lymphadenopathy  Lungs: Respiratory effort normal, unlabored  Cardiovascular:  Normal peripheral pulses  Abdomen: Soft, non-tender, non-distended with no organomegaly or palpable masses. : No CVA tenderness. Bladder non-tender and not distended. Pelvic: deferred    Lab Results   Component Value Date    BUN 20 04/25/2022     Lab Results   Component Value Date    CREATININE 0.77 04/25/2022       ASSESSMENT:  This is a 57 y.o. female with the following diagnoses:   Diagnosis Orders   1. Renal stones  Urinalysis with Microscopic    Culture, Urine    CBC with Auto Differential    EKG 12 Lead   2.  Renal colic  Urinalysis with Microscopic    Culture, Urine    CBC with Auto Differential    EKG 12 Lead         PLAN:  We will move forward with right-sided holmium laser lithotripsy. Patient will need some preoperative testing. She does understand that we are avoiding the CT scan. She does understand that we may not find ureteral stone. She is amenable to this. She does understand the risks and benefits of the procedure. She would like to be scheduled tomorrow.

## 2022-06-27 NOTE — H&P (VIEW-ONLY)
any relief. Patient has had no gross hematuria recently. She did stop her current course of antibiotics approximately 2 days ago. Patient's pain is mild to moderate today. This is in the right flank but radiates mostly towards the right groin. Past Medical History:    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 ([address]     Thyroid disease        Past Surgical History:    Past Surgical History:   Procedure Laterality Date    APPENDECTOMY      BACK SURGERY      CYSTOSCOPY Right 5/21/2019    CYSTOSCOPY URETEROSCOPY LASER-WITH HLL performed by Don Wiggins MD at [address]  5/21/2019    CYSTOSCOPY URETERAL STENT INSERTION performed by Don Wiggins MD at [address] [facility] [address] [facility]  1990's    D/T heavy bleeding and clots, still has ovaries.  KNEE SURGERY      bilat x 5    LITHOTRIPSY Right 05/21/2019    Cysto, HLL, stent placement       Medications Prior to Admission:    Prior to Admission medications    Medication Sig Start Date End Date Taking?  Authorizing Provider   traZODone (DESYREL) 50 MG tablet Take 0.5 tablets by mouth nightly 6/22/22   NAHOMY Vaaldez CNP   atorvastatin (LIPITOR) 10 MG tablet Take 1 tablet by mouth daily  Patient not taking: Reported on 5/25/2022 4/27/22   NAHOMY Valadez CNP   levothyroxine (SYNTHROID) 100 MCG tablet Take 1 tablet by mouth daily  Patient taking differently: Take 100 mcg by mouth daily Indications: patient is taking 88 mcg  1/5/22   NAHOMY Valadez CNP   ibuprofen (ADVIL) 200 MG CAPS Take 1 capsule by mouth as needed for Fever    Historical Provider, MD       Allergies:  Bactrim [sulfamethoxazole-trimethoprim] and Macrobid [nitrofurantoin monohyd macro]    Social History:    Social History     Socioeconomic History    Marital status:      Spouse name: Not on file    Number of Cancer Father     Other Other         No family h/o ovarian or breast cancer.  Deep Vein Thrombosis Brother        REVIEW OF SYSTEMS:  All systems reviewed and negative except for that already noted in the HPI. Physical Exam:      This a 57 y.o. female   Patient Vitals for the past 24 hrs:   Height Weight   06/27/22 1454 5' 7.75\" (1.721 m) 182 lb (82.6 kg)     Constitutional: Patient in no acute distress. Neuro: Alert and oriented to person, place and time. Psych: mood and affect normal  HEENT negative  Lungs: Respiratory effort is normal  Cardiovascular: Normal peripheral pulses  Abdomen: Soft, non-tender, non-distended with no CVA, flank pain or hepatosplenomegaly. No hernias. Kidneys normal.  Lymphatics: No palpable lymphadenopathy. Bladder non-tender and not distended. Pelvic exam:  External genitalia normal  Urethral and urethral meatus normal  Vagina normal with no evidence of pelvic prolapse  Uterus normal  Adnexa normal  Anus and perineum normal  Rectal exam not indicated    LABS:   Recent Labs     06/27/22  1355   WBC 6.7   HGB 13.3   HCT 41.1   MCV 93.4        No results for input(s): NA, K, CL, CO2, PHOS, BUN, CREATININE, CA in the last 72 hours. Additional Lab/culture results:    Urinalysis: No results for input(s): COLORU, PHUR, LABCAST, WBCUA, RBCUA, MUCUS, TRICHOMONAS, YEAST, BACTERIA, CLARITYU, SPECGRAV, LEUKOCYTESUR, UROBILINOGEN, BILIRUBINUR, BLOODU in the last 72 hours.     Invalid input(s): NITRATE, GLUCOSEUKETONESUAMORPHOUS     -----------------------------------------------------------------  Imaging Results:      Assessment and Plan   Impression:    Patient Active Problem List   Diagnosis    Renal colic    Constipation    Renal stones    Ureteral calculus    COVID-19 virus infection /  2020    Hyperthyroidism    Chronic right hip pain    Mixed hyperlipidemia       Plan: Right Tierra Morton MD  4:57 PM 6/27/2022

## 2022-06-27 NOTE — PROGRESS NOTES
Patient instructed on the pre-operative, intra-operative, and post-operative process. Patient instructed on NPO status. Medication instructions and pre operative instruction sheet reviewed with the patient. Instructed pt to take synthroid with a small sip of water prior to arriving to the hospital the day of surgery.

## 2022-06-27 NOTE — H&P
History and Physical    Patient:  Theressa Barthel  MRN: [id number]    CHIEF COMPLAINT:  Right flank pain    HISTORY OF PRESENT ILLNESS:   The patient is a 57 y.o. female who presents with right flank pain. History  Referral from Dr. Manuelito Brown for flank pain.  Patient has had intermittent flank pain for several years. Kelly Dean does have a history of stones, has never had surgery.  She spends a significant amount of time in [place] and has seen a urologist in [place] in the past. Kelly Dean denies gross hematuria, dysuria, frequency, nocturia, urgency, or incontinence. Kelly Dean does report significant constipation, and has noticed worsening flank and abdominal pain with worsening bowel issues. Kelly Dean will go several days without a bowel movement.  She denies history of frequent urinary tract infections.  She did have a CT scan performed in 2013 that did show nonobstructing bilateral renal stones.    11/2018 CT showed bilateral punctate, nonobstructing stones.  Recent urine culture was negative for infection, but did show 2-5 rbc/hpf.  She is taking MiraLAX daily for her bowels.  She denies any flank or abdominal pain, dysuria or gross hematuria.     Previous urology records summarized below:  12/2013 CT urogram showed 2 x 4 distal ureteral stone, no filling defects to suggest malignancy.  Cystoscopy was negative.     She did do a 24-hour urine collection on 2/2014 that showed extreme hypercalciuria, borderline low urine pH, and mild hyperuricosuira. Kelly Dean was started on hydrochlorothiazide 12.5 mg daily.     5/21/2019 Right HLL     Currently  Patient is here today with flank pain x7 days. Patient did cancel her normal follow-up. Meeting last year. Patient is experiencing right flank pain x1 month. Patient does have extensive stone history. Her last CT scan did show multiple stones in both kidneys. On the right side they did measure approximately 5 mm. Patient has been through several rounds of antibiotics.   These have not allowed her any relief. Patient has had no gross hematuria recently. She did stop her current course of antibiotics approximately 2 days ago. Patient's pain is mild to moderate today. This is in the right flank but radiates mostly towards the right groin. Past Medical History:    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 ([address]     Thyroid disease        Past Surgical History:    Past Surgical History:   Procedure Laterality Date    APPENDECTOMY      BACK SURGERY      CYSTOSCOPY Right 5/21/2019    CYSTOSCOPY URETEROSCOPY LASER-WITH HLL performed by Lisa Mccracken MD at [facility] 9  5/21/2019    CYSTOSCOPY URETERAL STENT INSERTION performed by Lisa Mccracken MD at [place] [address] ([address] [place] [address])  1990's    D/T heavy bleeding and clots, still has ovaries.  KNEE SURGERY      bilat x 5    LITHOTRIPSY Right 05/21/2019    Cysto, HLL, stent placement       Medications Prior to Admission:    Prior to Admission medications    Medication Sig Start Date End Date Taking?  Authorizing Provider   traZODone (DESYREL) 50 MG tablet Take 0.5 tablets by mouth nightly 6/22/22   Lucretia Small 1615, APRN - CNP   atorvastatin (LIPITOR) 10 MG tablet Take 1 tablet by mouth daily  Patient not taking: Reported on 5/25/2022 4/27/22   Lucretia Small 1615, APRN - CNP   levothyroxine (SYNTHROID) 100 MCG tablet Take 1 tablet by mouth daily  Patient taking differently: Take 100 mcg by mouth daily Indications: patient is taking 88 mcg  1/5/22   Lucretia Small 1615, APRN - CNP   ibuprofen (ADVIL) 200 MG CAPS Take 1 capsule by mouth as needed for Fever    Historical Provider, MD       Allergies:  Bactrim [sulfamethoxazole-trimethoprim] and Macrobid [nitrofurantoin monohyd macro]    Social History:    Social History     Socioeconomic History    Marital status:      Spouse name: Not on file    Number of children: Not on file    Years of education: Not on file    Highest education level: Not on file   Occupational History    Not on file   Tobacco Use    Smoking status: Never Smoker    Smokeless tobacco: Never Used   Vaping Use    Vaping Use: Never used   Substance and Sexual Activity    Alcohol use: No    Drug use: Never    Sexual activity: Yes     Partners: Male   Other Topics Concern    Not on file   Social History Narrative    Not on file     Social Determinants of Health     Financial Resource Strain: Low Risk     Difficulty of Paying Living Expenses: Not hard at all   Food Insecurity: No Food Insecurity    Worried About Running Out of Food in the Last Year: Never true    Bonifacio of Food in the Last Year: Never true   Transportation Needs: No Transportation Needs    Lack of Transportation (Medical): No    Lack of Transportation (Non-Medical): No   Physical Activity: Sufficiently Active    Days of Exercise per Week: 7 days    Minutes of Exercise per Session: 60 min   Stress: No Stress Concern Present    Feeling of Stress : Only a little   Social Connections: Socially Integrated    Frequency of Communication with Friends and Family: More than three times a week    Frequency of Social Gatherings with Friends and Family: More than three times a week    Attends Evangelical Services: More than 4 times per year    Active Member of [address] [place] [address] [employer] or Organizations:  Yes    Attends Club or Organization Meetings: More than 4 times per year    Marital Status:    Intimate Partner Violence: Not At Risk    Fear of Current or Ex-Partner: No    Emotionally Abused: No    Physically Abused: No    Sexually Abused: No   Housing Stability: Low Risk     Unable to Pay for Housing in the Last Year: No    Number of Places Lived in the Last Year: 1    Unstable Housing in the Last Year: No       Family History:    Family History   Problem Relation Age of Onset    Diabetes Father     Kidney Disease Father    Winston Cancer Father     Other Other         No family h/o ovarian or breast cancer.  Deep Vein Thrombosis Brother        REVIEW OF SYSTEMS:  All systems reviewed and negative except for that already noted in the HPI. Physical Exam:      This a 57 y.o. female   Patient Vitals for the past 24 hrs:   Height Weight   06/27/22 1454 5' 7.75\" (1.721 m) 182 lb (82.6 kg)     Constitutional: Patient in no acute distress. Neuro: Alert and oriented to person, place and time. Psych: mood and affect normal  HEENT negative  Lungs: Respiratory effort is normal  Cardiovascular: Normal peripheral pulses  Abdomen: Soft, non-tender, non-distended with no CVA, flank pain or hepatosplenomegaly. No hernias. Kidneys normal.  Lymphatics: No palpable lymphadenopathy. Bladder non-tender and not distended. Pelvic exam:  External genitalia normal  Urethral and urethral meatus normal  Vagina normal with no evidence of pelvic prolapse  Uterus normal  Adnexa normal  Anus and perineum normal  Rectal exam not indicated    LABS:   Recent Labs     06/27/22  1355   WBC 6.7   HGB 13.3   HCT 41.1   MCV 93.4        No results for input(s): NA, K, CL, CO2, PHOS, BUN, CREATININE, CA in the last 72 hours. Additional Lab/culture results:    Urinalysis: No results for input(s): COLORU, PHUR, LABCAST, WBCUA, RBCUA, MUCUS, TRICHOMONAS, YEAST, BACTERIA, CLARITYU, SPECGRAV, LEUKOCYTESUR, UROBILINOGEN, BILIRUBINUR, BLOODU in the last 72 hours.     Invalid input(s): NITRATE, GLUCOSEUKETONESUAMORPHOUS     -----------------------------------------------------------------  Imaging Results:      Assessment and Plan   Impression:    Patient Active Problem List   Diagnosis    Renal colic    Constipation    Renal stones    Ureteral calculus    COVID-19 virus infection /  2020    Hyperthyroidism    Chronic right hip pain    Mixed hyperlipidemia       Plan: Right Ab Hawk MD  4:57 PM 6/27/2022

## 2022-06-28 ENCOUNTER — APPOINTMENT (OUTPATIENT)
Dept: GENERAL RADIOLOGY | Age: 62
End: 2022-06-28
Attending: UROLOGY
Payer: COMMERCIAL

## 2022-06-28 ENCOUNTER — ANESTHESIA (OUTPATIENT)
Dept: OPERATING ROOM | Age: 62
End: 2022-06-28
Payer: COMMERCIAL

## 2022-06-28 ENCOUNTER — HOSPITAL ENCOUNTER (OUTPATIENT)
Age: 62
Setting detail: OUTPATIENT SURGERY
Discharge: HOME | End: 2022-06-28
Attending: UROLOGY | Admitting: UROLOGY
Payer: COMMERCIAL

## 2022-06-28 VITALS
RESPIRATION RATE: 16 BRPM | BODY MASS INDEX: 27.58 KG/M2 | SYSTOLIC BLOOD PRESSURE: 140 MMHG | DIASTOLIC BLOOD PRESSURE: 62 MMHG | HEIGHT: 68 IN | WEIGHT: 182 LBS | TEMPERATURE: 97.2 F | OXYGEN SATURATION: 98 % | HEART RATE: 87 BPM

## 2022-06-28 DIAGNOSIS — N20.0 RENAL STONES: ICD-10-CM

## 2022-06-28 DIAGNOSIS — N23 RENAL COLIC: Primary | ICD-10-CM

## 2022-06-28 LAB
CULTURE: NORMAL
SPECIMEN DESCRIPTION: NORMAL

## 2022-06-28 PROCEDURE — 6370000000 HC RX 637 (ALT 250 FOR IP): Performed by: NURSE ANESTHETIST, CERTIFIED REGISTERED

## 2022-06-28 PROCEDURE — 7100000000 HC PACU RECOVERY - FIRST 15 MIN: Performed by: UROLOGY

## 2022-06-28 PROCEDURE — 6360000002 HC RX W HCPCS: Performed by: NURSE ANESTHETIST, CERTIFIED REGISTERED

## 2022-06-28 PROCEDURE — 3600000004 HC SURGERY LEVEL 4 BASE: Performed by: UROLOGY

## 2022-06-28 PROCEDURE — 7100000011 HC PHASE II RECOVERY - ADDTL 15 MIN: Performed by: UROLOGY

## 2022-06-28 PROCEDURE — 3600000014 HC SURGERY LEVEL 4 ADDTL 15MIN: Performed by: UROLOGY

## 2022-06-28 PROCEDURE — 2709999900 HC NON-CHARGEABLE SUPPLY: Performed by: UROLOGY

## 2022-06-28 PROCEDURE — 2500000003 HC RX 250 WO HCPCS: Performed by: NURSE ANESTHETIST, CERTIFIED REGISTERED

## 2022-06-28 PROCEDURE — 3700000000 HC ANESTHESIA ATTENDED CARE: Performed by: UROLOGY

## 2022-06-28 PROCEDURE — 2580000003 HC RX 258: Performed by: NURSE ANESTHETIST, CERTIFIED REGISTERED

## 2022-06-28 PROCEDURE — 2720000010 HC SURG SUPPLY STERILE: Performed by: UROLOGY

## 2022-06-28 PROCEDURE — 3209999900 FLUORO FOR SURGICAL PROCEDURES

## 2022-06-28 PROCEDURE — 3700000001 HC ADD 15 MINUTES (ANESTHESIA): Performed by: UROLOGY

## 2022-06-28 PROCEDURE — 7100000010 HC PHASE II RECOVERY - FIRST 15 MIN: Performed by: UROLOGY

## 2022-06-28 PROCEDURE — C1758 CATHETER, URETERAL: HCPCS | Performed by: UROLOGY

## 2022-06-28 PROCEDURE — 7100000001 HC PACU RECOVERY - ADDTL 15 MIN: Performed by: UROLOGY

## 2022-06-28 PROCEDURE — 6370000000 HC RX 637 (ALT 250 FOR IP): Performed by: UROLOGY

## 2022-06-28 PROCEDURE — 6360000002 HC RX W HCPCS: Performed by: UROLOGY

## 2022-06-28 PROCEDURE — C1769 GUIDE WIRE: HCPCS | Performed by: UROLOGY

## 2022-06-28 PROCEDURE — C2617 STENT, NON-COR, TEM W/O DEL: HCPCS | Performed by: UROLOGY

## 2022-06-28 DEVICE — URETERAL STENT WITH SIDE HOLES 6FX26CM
Type: IMPLANTABLE DEVICE | Site: URETER | Status: FUNCTIONAL
Brand: TRIA™ FIRM

## 2022-06-28 RX ORDER — ONDANSETRON 2 MG/ML
INJECTION INTRAMUSCULAR; INTRAVENOUS PRN
Status: DISCONTINUED | OUTPATIENT
Start: 2022-06-28 | End: 2022-06-28 | Stop reason: SDUPTHER

## 2022-06-28 RX ORDER — HYDROCODONE BITARTRATE AND ACETAMINOPHEN 5; 325 MG/1; MG/1
1 TABLET ORAL EVERY 6 HOURS PRN
Qty: 12 TABLET | Refills: 0 | Status: SHIPPED | OUTPATIENT
Start: 2022-06-28 | End: 2022-07-01

## 2022-06-28 RX ORDER — LIDOCAINE HYDROCHLORIDE 20 MG/ML
INJECTION, SOLUTION EPIDURAL; INFILTRATION; INTRACAUDAL; PERINEURAL PRN
Status: DISCONTINUED | OUTPATIENT
Start: 2022-06-28 | End: 2022-06-28 | Stop reason: SDUPTHER

## 2022-06-28 RX ORDER — DIMENHYDRINATE 50 MG/1
50 TABLET ORAL ONCE
Status: COMPLETED | OUTPATIENT
Start: 2022-06-28 | End: 2022-06-28

## 2022-06-28 RX ORDER — SODIUM CHLORIDE 0.9 % (FLUSH) 0.9 %
5-40 SYRINGE (ML) INJECTION PRN
Status: DISCONTINUED | OUTPATIENT
Start: 2022-06-28 | End: 2022-06-28 | Stop reason: HOSPADM

## 2022-06-28 RX ORDER — FENTANYL CITRATE 50 UG/ML
50 INJECTION, SOLUTION INTRAMUSCULAR; INTRAVENOUS EVERY 5 MIN PRN
Status: COMPLETED | OUTPATIENT
Start: 2022-06-28 | End: 2022-06-28

## 2022-06-28 RX ORDER — KETOROLAC TROMETHAMINE 30 MG/ML
INJECTION, SOLUTION INTRAMUSCULAR; INTRAVENOUS PRN
Status: DISCONTINUED | OUTPATIENT
Start: 2022-06-28 | End: 2022-06-28 | Stop reason: SDUPTHER

## 2022-06-28 RX ORDER — OXYCODONE HYDROCHLORIDE 5 MG/1
5 TABLET ORAL PRN
Status: COMPLETED | OUTPATIENT
Start: 2022-06-28 | End: 2022-06-28

## 2022-06-28 RX ORDER — ACETAMINOPHEN 325 MG/1
650 TABLET ORAL ONCE
Status: COMPLETED | OUTPATIENT
Start: 2022-06-28 | End: 2022-06-28

## 2022-06-28 RX ORDER — OXYCODONE HYDROCHLORIDE 5 MG/1
10 TABLET ORAL PRN
Status: COMPLETED | OUTPATIENT
Start: 2022-06-28 | End: 2022-06-28

## 2022-06-28 RX ORDER — METOCLOPRAMIDE HYDROCHLORIDE 5 MG/ML
10 INJECTION INTRAMUSCULAR; INTRAVENOUS
Status: DISCONTINUED | OUTPATIENT
Start: 2022-06-28 | End: 2022-06-28 | Stop reason: HOSPADM

## 2022-06-28 RX ORDER — LIDOCAINE HYDROCHLORIDE 20 MG/ML
JELLY TOPICAL PRN
Status: DISCONTINUED | OUTPATIENT
Start: 2022-06-28 | End: 2022-06-28 | Stop reason: ALTCHOICE

## 2022-06-28 RX ORDER — SODIUM CHLORIDE 0.9 % (FLUSH) 0.9 %
5-40 SYRINGE (ML) INJECTION EVERY 12 HOURS SCHEDULED
Status: DISCONTINUED | OUTPATIENT
Start: 2022-06-28 | End: 2022-06-28 | Stop reason: HOSPADM

## 2022-06-28 RX ORDER — FENTANYL CITRATE 50 UG/ML
INJECTION, SOLUTION INTRAMUSCULAR; INTRAVENOUS PRN
Status: DISCONTINUED | OUTPATIENT
Start: 2022-06-28 | End: 2022-06-28 | Stop reason: SDUPTHER

## 2022-06-28 RX ORDER — SODIUM CHLORIDE 9 MG/ML
INJECTION, SOLUTION INTRAVENOUS PRN
Status: DISCONTINUED | OUTPATIENT
Start: 2022-06-28 | End: 2022-06-28 | Stop reason: HOSPADM

## 2022-06-28 RX ORDER — FENTANYL CITRATE 50 UG/ML
25 INJECTION, SOLUTION INTRAMUSCULAR; INTRAVENOUS EVERY 5 MIN PRN
Status: COMPLETED | OUTPATIENT
Start: 2022-06-28 | End: 2022-06-28

## 2022-06-28 RX ORDER — SODIUM CHLORIDE, SODIUM LACTATE, POTASSIUM CHLORIDE, CALCIUM CHLORIDE 600; 310; 30; 20 MG/100ML; MG/100ML; MG/100ML; MG/100ML
INJECTION, SOLUTION INTRAVENOUS CONTINUOUS
Status: DISCONTINUED | OUTPATIENT
Start: 2022-06-28 | End: 2022-06-28 | Stop reason: HOSPADM

## 2022-06-28 RX ORDER — DEXAMETHASONE SODIUM PHOSPHATE 4 MG/ML
INJECTION, SOLUTION INTRA-ARTICULAR; INTRALESIONAL; INTRAMUSCULAR; INTRAVENOUS; SOFT TISSUE PRN
Status: DISCONTINUED | OUTPATIENT
Start: 2022-06-28 | End: 2022-06-28 | Stop reason: SDUPTHER

## 2022-06-28 RX ORDER — ONDANSETRON 2 MG/ML
4 INJECTION INTRAMUSCULAR; INTRAVENOUS
Status: DISCONTINUED | OUTPATIENT
Start: 2022-06-28 | End: 2022-06-28 | Stop reason: HOSPADM

## 2022-06-28 RX ORDER — PROPOFOL 10 MG/ML
INJECTION, EMULSION INTRAVENOUS PRN
Status: DISCONTINUED | OUTPATIENT
Start: 2022-06-28 | End: 2022-06-28 | Stop reason: SDUPTHER

## 2022-06-28 RX ADMIN — LIDOCAINE HYDROCHLORIDE 100 MG: 20 INJECTION, SOLUTION EPIDURAL; INFILTRATION; INTRACAUDAL; PERINEURAL at 14:44

## 2022-06-28 RX ADMIN — CEFAZOLIN SODIUM 2000 MG: 10 INJECTION, POWDER, FOR SOLUTION INTRAVENOUS at 14:17

## 2022-06-28 RX ADMIN — SODIUM CHLORIDE, POTASSIUM CHLORIDE, SODIUM LACTATE AND CALCIUM CHLORIDE: 600; 310; 30; 20 INJECTION, SOLUTION INTRAVENOUS at 13:05

## 2022-06-28 RX ADMIN — DEXAMETHASONE SODIUM PHOSPHATE 4 MG: 4 INJECTION, SOLUTION INTRAMUSCULAR; INTRAVENOUS at 14:48

## 2022-06-28 RX ADMIN — DIMENHYDRINATE 50 MG: 50 TABLET ORAL at 13:05

## 2022-06-28 RX ADMIN — FENTANYL CITRATE 25 MCG: 50 INJECTION, SOLUTION INTRAMUSCULAR; INTRAVENOUS at 16:03

## 2022-06-28 RX ADMIN — OXYCODONE 5 MG: 5 TABLET ORAL at 16:46

## 2022-06-28 RX ADMIN — ACETAMINOPHEN 650 MG: 325 TABLET ORAL at 13:05

## 2022-06-28 RX ADMIN — PROPOFOL 200 MG: 10 INJECTION, EMULSION INTRAVENOUS at 14:45

## 2022-06-28 RX ADMIN — FENTANYL CITRATE 25 MCG: 50 INJECTION INTRAMUSCULAR; INTRAVENOUS at 15:06

## 2022-06-28 RX ADMIN — FENTANYL CITRATE 25 MCG: 50 INJECTION INTRAMUSCULAR; INTRAVENOUS at 15:04

## 2022-06-28 RX ADMIN — FENTANYL CITRATE 50 MCG: 50 INJECTION, SOLUTION INTRAMUSCULAR; INTRAVENOUS at 15:46

## 2022-06-28 RX ADMIN — ONDANSETRON 4 MG: 2 INJECTION INTRAMUSCULAR; INTRAVENOUS at 14:39

## 2022-06-28 RX ADMIN — KETOROLAC TROMETHAMINE 30 MG: 30 INJECTION, SOLUTION INTRAMUSCULAR at 15:15

## 2022-06-28 RX ADMIN — PROPOFOL 50 MG: 10 INJECTION, EMULSION INTRAVENOUS at 14:47

## 2022-06-28 RX ADMIN — FENTANYL CITRATE 25 MCG: 50 INJECTION, SOLUTION INTRAMUSCULAR; INTRAVENOUS at 15:58

## 2022-06-28 RX ADMIN — FENTANYL CITRATE 50 MCG: 50 INJECTION, SOLUTION INTRAMUSCULAR; INTRAVENOUS at 15:39

## 2022-06-28 ASSESSMENT — PAIN SCALES - GENERAL
PAINLEVEL_OUTOF10: 7
PAINLEVEL_OUTOF10: 8
PAINLEVEL_OUTOF10: 8
PAINLEVEL_OUTOF10: 7
PAINLEVEL_OUTOF10: 8
PAINLEVEL_OUTOF10: 6
PAINLEVEL_OUTOF10: 7
PAINLEVEL_OUTOF10: 8
PAINLEVEL_OUTOF10: 8
PAINLEVEL_OUTOF10: 6
PAINLEVEL_OUTOF10: 7
PAINLEVEL_OUTOF10: 6

## 2022-06-28 ASSESSMENT — PAIN DESCRIPTION - ORIENTATION
ORIENTATION: RIGHT

## 2022-06-28 ASSESSMENT — PAIN DESCRIPTION - LOCATION
LOCATION: ABDOMEN
LOCATION: FLANK
LOCATION: FLANK

## 2022-06-28 ASSESSMENT — PAIN DESCRIPTION - DESCRIPTORS: DESCRIPTORS: SORE

## 2022-06-28 ASSESSMENT — PAIN DESCRIPTION - PAIN TYPE: TYPE: SURGICAL PAIN

## 2022-06-28 NOTE — PROGRESS NOTES
Pt states pain still 6-7/10, agrees to sit up a little and states she feels a little dizzy when sitting up, states she's not quite ready to move to phase 2.

## 2022-06-28 NOTE — PROGRESS NOTES
Noticed in discharge instructions that prescription for Norco states to ask physician where to  prescription, upon further review order for Cindy Fischer says \"transmission to pharmacy failed. \" Jeff Holland who state their computer system was down for several hours and just came back up and state since prescription is for a controlled substance, a new order would have to be sent in or a paper copy brought by the patient. Called Dr. Neena Champion who states he will try to put a new order in the computer to be e-prescribed.

## 2022-06-28 NOTE — BRIEF OP NOTE
Brief Postoperative Note      Patient: Salma Rogers  YOB: 1960  MRN: [id number]    Date of Procedure: 6/28/2022    Pre-Op Diagnosis: right ureteral calc    Post-Op Diagnosis: Same       Procedure(s):  CYSTOSCOPY URETEROSCOPY LASER-HLL  CYSTOSCOPY URETERAL STENT INSERTION-    Surgeon(s):  Rene Palacio MD    Assistant:  * No surgical staff found *    Anesthesia: General    Estimated Blood Loss (mL): Minimal    Complications: None    Specimens:   * No specimens in log *    Implants:  Implant Name Type Inv.  Item Serial No.  Lot No. LRB No. Used Action   STENT URET 6 FRX26 CM FIRM MONOFILAMENT TRIA - [id number]  STENT URET 6 FRX26 CM FIRM MONOFILAMENT TRIA  Convrrt Pending sale to [facility] [id number] Right 1 Implanted         Drains: * No LDAs found *    Findings: multiple right renal calculus    Electronically signed by Rene Palacio MD on 6/28/2022 at 3:50 PM

## 2022-06-28 NOTE — ANESTHESIA POSTPROCEDURE EVALUATION
Department of Anesthesiology  Postprocedure Note    Patient: Timothy Barros  MRN: [id number]  YOB: 1960  Date of evaluation: 6/28/2022      Procedure Summary     Date: 06/28/22 Room / Location: [facility]    Anesthesia Start: 9883 Anesthesia Stop: 9601    Procedures:       CYSTOSCOPY URETEROSCOPY LASER-HLL (Right Ureter)      CYSTOSCOPY URETERAL STENT INSERTION- (Right Ureter) Diagnosis:       Right ureteral calculus      (right ureteral calc)    Surgeons: Mariposa Da Silva MD Responsible Provider: Carleen Monroy    Anesthesia Type: General ASA Status: 2          Anesthesia Type: General    Parag Phase I: Parag Score: 10    Parag Phase II: Parag Score: 10      Anesthesia Post Evaluation    Patient location during evaluation: PACU  Patient participation: complete - patient participated  Level of consciousness: awake and alert  Pain score: 4  Airway patency: patent  Nausea & Vomiting: no vomiting and nausea  Complications: no  Cardiovascular status: blood pressure returned to baseline  Respiratory status: acceptable  Hydration status: stable

## 2022-06-28 NOTE — PROGRESS NOTES
0286 When assisting pt to recliner from cart, pt states she feels like her left calf is cramping up, states it feels worse than a dmitry-horse, warm blanket applied. [id number] Just prior to discharge pt requests to use restroom, when she stands up her left calf continues to feel cramped and is visibly in pain from it. Spoke to ENE Rocha who states unlikely from SCD pump, as those don't squeeze that hard. States it is probably just a cramp and patient can use hot or cold on it which ever feels better, massage, otc pain medications and to call PCP if not improved.

## 2022-06-28 NOTE — ANESTHESIA PRE PROCEDURE
Department of Anesthesiology  Preprocedure Note       Name:  Remberto Aviles   Age:  57 y.o.  :  1960                                          MRN:  [id number]         Date:  2022      Surgeon: Avis Lemus):  Jonatan Cloud MD    Procedure: Procedure(s):  CYSTOSCOPY URETEROSCOPY LASER-HLL  CYSTOSCOPY URETERAL STENT INSERTION-    Medications prior to admission:   Prior to Admission medications    Medication Sig Start Date End Date Taking? Authorizing Provider   traZODone (DESYREL) 50 MG tablet Take 0.5 tablets by mouth nightly  Patient not taking: Reported on 2022   NAHOMY Porras CNP   atorvastatin (LIPITOR) 10 MG tablet Take 1 tablet by mouth daily  Patient not taking: Reported on 2022   NAHOMY Porras CNP   levothyroxine (SYNTHROID) 100 MCG tablet Take 1 tablet by mouth daily  Patient taking differently: Take 100 mcg by mouth daily Indications: patient is taking 88 mcg  22   NAHOMY Porras CNP   ibuprofen (ADVIL) 200 MG CAPS Take 1 capsule by mouth as needed for Fever    Historical Provider, MD       Current medications:    Current Facility-Administered Medications   Medication Dose Route Frequency Provider Last Rate Last Admin    lactated ringers infusion   IntraVENous Continuous NAHOMY Tejada CRNA 100 mL/hr at 22 1305 New Bag at 22 1305    ceFAZolin (ANCEF) 2000 mg in dextrose 5 % 100 mL IVPB  2,000 mg IntraVENous On Call to Emma Gibbs MD           Allergies:     Allergies   Allergen Reactions    Bactrim [Sulfamethoxazole-Trimethoprim] Anaphylaxis    Macrobid [Nitrofurantoin Monohyd Macro] Anaphylaxis       Problem List:    Patient Active Problem List   Diagnosis Code    Renal colic E25    Constipation K59.00    Renal stones N20.0    Ureteral calculus N20.1    COVID-19 virus infection /  2020 U07.1    Hyperthyroidism E05.90    Chronic right hip pain M25.551, G89.29    Mixed hyperlipidemia E78.2       Past Medical History:        Diagnosis Date    High cholesterol     Pineal hyperplasia, 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  KNEE SURGERY      bilat x 5    LITHOTRIPSY Right 05/21/2019    Cysto, HLL, stent placement       Social History:    Social History     Tobacco Use    Smoking status: Never Smoker    Smokeless tobacco: Never Used   Substance Use Topics    Alcohol use: No                                Counseling given: Not Answered      Vital Signs (Current):   Vitals:    06/27/22 1454 06/28/22 1245   BP:  (!) 148/73   Pulse:  68   Resp:  14   Temp:  36.1 °C (97 °F)   TempSrc:  Temporal   SpO2:  97%   Weight: 182 lb (82.6 kg)    Height: 5' 7.75\" (1.721 m)                                               BP Readings from Last 3 Encounters:   06/28/22 (!) 148/73   06/27/22 132/82   06/22/22 106/64       NPO Status: Time of last liquid consumption: 0830                        Time of last solid consumption: 2200                        Date of last liquid consumption: 06/28/22                        Date of last solid food consumption: 06/27/22    BMI:   Wt Readings from Last 3 Encounters:   06/27/22 182 lb (82.6 kg)   06/27/22 182 lb (82.6 kg)   06/22/22 187 lb (84.8 kg)     Body mass index is 27.88 kg/m².     CBC:   Lab Results   Component Value Date    WBC 6.7 06/27/2022    RBC 4.40 06/27/2022    RBC 4.23 04/25/2022    HGB 13.3 06/27/2022    HCT 41.1 06/27/2022    MCV 93.4 06/27/2022    RDW 12.2 06/27/2022     06/27/2022       CMP:   Lab Results   Component Value Date     04/25/2022    K 4.3 04/25/2022     04/25/2022    CO2 29 04/25/2022    BUN 20 04/25/2022    CREATININE 0.77 04/25/2022    GFRAA >60 05/21/2019    LABGLOM >60 08/07/2020    LABGLOM >60 05/21/2019    GLUCOSE 92 04/25/2022    PROT 6.7 04/25/2022    CALCIUM 9.4 04/25/2022    BILITOT 0.7 04/25/2022    ALKPHOS 87 04/25/2022    ALKPHOS 94 08/07/2020    AST 24 04/25/2022    ALT 22 04/25/2022       POC Tests: No results for input(s): POCGLU, POCNA, POCK, POCCL, POCBUN, POCHEMO, POCHCT in the last 72 hours. Coags: No results found for: PROTIME, INR, APTT    HCG (If Applicable): No results found for: PREGTESTUR, PREGSERUM, HCG, HCGQUANT     ABGs: No results found for: PHART, PO2ART, SMC4YGA, IST4UMO, BEART, L3MVTQND     Type & Screen (If Applicable):  No results found for: LABABO, LABRH    Drug/Infectious Status (If Applicable):  No results found for: HIV, HEPCAB    COVID-19 Screening (If Applicable): No results found for: COVID19        Anesthesia Evaluation  Patient summary reviewed and Nursing notes reviewed no history of anesthetic complications:   Airway: Mallampati: I  TM distance: >3 FB   Neck ROM: full  Mouth opening: > = 3 FB   Dental: normal exam         Pulmonary:Negative Pulmonary ROS and normal exam                               Cardiovascular:  Exercise tolerance: good (>4 METS),   (+) hyperlipidemia      ECG reviewed                        Neuro/Psych:   Negative Neuro/Psych ROS              GI/Hepatic/Renal:   (+) renal disease: kidney stones,           Endo/Other:    (+) hypothyroidism::., .                 Abdominal:             Vascular: negative vascular ROS. Other Findings:           Anesthesia Plan      general     ASA 2       Induction: intravenous. MIPS: Postoperative opioids intended and Prophylactic antiemetics administered. Anesthetic plan and risks discussed with patient.       Plan discussed with [address], APRN - CRNA   6/28/2022

## 2022-06-28 NOTE — PROGRESS NOTES
Patient verbalizes readiness for discharge. Discharge instructions given to patient and , answered all questions, and verbalized understanding of discharge instructions. Discharge Criteria    Inpatients must meet Criteria 1 through 7. All other patients are either YES or N/A. If a NO is chosen then Anesthesia or Surgeon must be notified. 1.  Minimum 30 minutes after last dose of sedative medication, minimum 120 minutes after last dose of reversal agent. Yes      2. Systolic BP stable within 20 mmHg for 30 minutes & systolic BP between 90 & 134 or within 10 mmHg of baseline. Yes      3. Pulse between 60 and 100 or within 10 bpm of baseline. Yes      4. Spontaneous respiratory rate >/= 10 per minute. Yes      5. SaO2 >/= 95 or  >/= baseline. Yes      6. Able to cough and swallow or return to baseline function. Yes      7. Alert and oriented or return to baseline mental status. Yes      8. Demonstrates controlled, coordinated movements, ambulates with steady gait, or return to baseline activity function. Yes      9. Minimal or no pain or nausea, or at a level tolerable and acceptable to patient. No, pt continues to have pain but states she wishes to go home at this time, feels very tired and wishes to sleep. 10. Takes and retains oral fluids as allowed. Yes      11. Procedural / perioperative site stable. Minimal or no bleeding. Yes          12. If GI endoscopy procedure, minimal or no abdominal distention or passing flatus. N/A      13. Written discharge instructions and emergency telephone number provided. Yes      14. Accompanied by a responsible adult.     Yes

## 2022-06-29 ENCOUNTER — OFFICE VISIT (OUTPATIENT)
Dept: UROLOGY | Age: 62
End: 2022-06-29
Payer: COMMERCIAL

## 2022-06-29 ENCOUNTER — TELEPHONE (OUTPATIENT)
Dept: UROLOGY | Age: 62
End: 2022-06-29

## 2022-06-29 VITALS
SYSTOLIC BLOOD PRESSURE: 142 MMHG | WEIGHT: 183 LBS | HEIGHT: 68 IN | BODY MASS INDEX: 27.74 KG/M2 | DIASTOLIC BLOOD PRESSURE: 82 MMHG | TEMPERATURE: 97.8 F

## 2022-06-29 DIAGNOSIS — N20.0 RENAL STONES: Primary | ICD-10-CM

## 2022-06-29 PROCEDURE — 99214 OFFICE O/P EST MOD 30 MIN: CPT | Performed by: PHYSICIAN ASSISTANT

## 2022-06-29 RX ORDER — PHENAZOPYRIDINE HYDROCHLORIDE 200 MG/1
200 TABLET, FILM COATED ORAL 3 TIMES DAILY PRN
Qty: 21 TABLET | Refills: 0 | Status: SHIPPED | OUTPATIENT
Start: 2022-06-29 | End: 2022-07-02

## 2022-06-29 RX ORDER — TAMSULOSIN HYDROCHLORIDE 0.4 MG/1
0.4 CAPSULE ORAL DAILY
Qty: 5 CAPSULE | Refills: 0 | Status: SHIPPED | OUTPATIENT
Start: 2022-06-29 | End: 2022-07-06

## 2022-06-29 RX ORDER — KETOROLAC TROMETHAMINE 10 MG/1
10 TABLET, FILM COATED ORAL EVERY 8 HOURS PRN
Qty: 15 TABLET | Refills: 0 | Status: SHIPPED | OUTPATIENT
Start: 2022-06-29 | End: 2022-10-26

## 2022-06-29 RX ORDER — OXYBUTYNIN CHLORIDE 5 MG/1
5 TABLET ORAL 3 TIMES DAILY PRN
Qty: 21 TABLET | Refills: 0 | Status: SHIPPED | OUTPATIENT
Start: 2022-06-29 | End: 2022-07-06

## 2022-06-29 ASSESSMENT — ENCOUNTER SYMPTOMS
VOMITING: 0
NAUSEA: 0
APNEA: 0
WHEEZING: 0
CONSTIPATION: 0
BACK PAIN: 0
ABDOMINAL PAIN: 0
EYE REDNESS: 0
COLOR CHANGE: 0
SHORTNESS OF BREATH: 0
COUGH: 0

## 2022-06-29 NOTE — PROGRESS NOTES
Pt had ureteral stent placed on 6/28/2022 following right HLL. Stent removed via string in office today without difficulty. Pt tolerated removal well.

## 2022-06-29 NOTE — PATIENT INSTRUCTIONS
You may experience waves of pain and/or nausea for the next 24-72 hrs. You may also experience burning with urination, frequency, urgency, bladder spasms, and blood in the urine. All of this should continue to improve over the next several days. The blood in the urine can last up to two weeks. 1) take ibuprofen (motrin) 600 mg (3 of the 200mg tabs) every 6 hours WITH FOOD for the next 72 hours. 2) drink at least 80 oz fluid (water, juice, Gatorade - NOT tea, coffee, soda pop) daily    Call our office [phone] or go to ER (if after normal office hours) if you develop fever, intractable vomiting, severe/intolerable pain.

## 2022-06-29 NOTE — OP NOTE
[address] [facility] [address]                                OPERATIVE REPORT    PATIENT NAME: Luke King                  :        1960  MED REC NO:   [id number]                              ROOM:  ACCOUNT NO:   [de-identified]                           ADMIT DATE: 2022  PROVIDER:     Jatinder Bella    DATE OF PROCEDURE:  2022    SURGEON:  Dr. Jatinder Bella. ASSISTANT:  None. PREOPERATIVE DIAGNOSIS:  Right renal colic. POSTOPERATIVE DIAGNOSIS:  Right renal calculus. OPERATION PERFORMED:  Cystoscopy, right ureteroscopy, right holmium  laser lithotripsy, and right ureteral stent placement. ANESTHESIA:  General.    COMPLICATIONS:  None. ESTIMATED BLOOD LOSS:  Minimal.    SPECIMENS:  None. PROSTHESIS:  A 6-Danish x 26 cm double-J ureteral stent. DISPOSITION:  Stable. FINDINGS:  Multiple stones in the right collecting system. INDICATIONS:  The patient is a 57-year-old female with extensive stone  history, here now for definitive therapy of her right side. DESCRIPTION OF PROCEDURE:  The patient was taken back to the operating  room after informed consent including all risks, benefits, and  alternatives were obtained. The patient was transferred from the [facility]  onto the operating room table, where she was induced under general  anesthesia, and given IV Ancef for preoperative antibiotic prophylaxis. To begin the case, she was prepped and draped in the normal sterile  fashion, and placed in dorsal lithotomy. She had a 22-Danish sheath  with a 30-degree lens passed through the urethra into the bladder. Once  in the bladder, we identified the right ureteral orifice. A 0.035-inch  wire was passed up the right ureter into the kidney. At this point in  time, we began with a rigid ureteroscope. No ureteral stones  identified. We then switched over to a flexible ureteroscope. Formal  pyeloscopy was performed. All poles of the kidney had stones in  multiple calices. We did use a 270 micron laser fiber and ablated these  stones in a systematic fashion. We did ablate these stones to sub 2 mm  to 3 mm fragments. At this point in time, we removed the ureteroscope. We placed a cystoscope over the Glidewire and placed a 6-Uzbek x 26 cm  double-J ureteral stent over the Glidewire up into the kidney. Glidewire was removed. Proximal curl was confirmed via fluoroscopy and  distal curl was confirmed via visualization. At this point in time, the  patient was awoken from general anesthesia, transferred to the [facility],  and taken to the PACU in satisfactory condition by the Nursing and  Anesthesia Teams. PLAN:  The patient will be discharged home per PACU criterion and follow  up with us tomorrow for stent removal via string.         Wyatt Lai    D: 06/28/2022 15:58:38       T: 06/28/2022 21:50:45     [email or web address]  Job#: [id number]     Doc#: [id number]    CC:

## 2022-06-29 NOTE — PROGRESS NOTES
floamxHPI:    Patient is a 57 y.o. female in no acute distress. She is alert and oriented to person, place, and time. History  Referral from Dr. Alva Zavala for flank pain.  Patient has had intermittent flank pain for several years. George Pina does have a history of stones, has never had surgery.  She spends a significant amount of time in [place] and has seen a urologist in [place] in the past. George Pina denies gross hematuria, dysuria, frequency, nocturia, urgency, or incontinence. George Pina does report significant constipation, and has noticed worsening flank and abdominal pain with worsening bowel issues. George Pina will go several days without a bowel movement.  She denies history of frequent urinary tract infections.  She did have a CT scan performed in 2013 that did show nonobstructing bilateral renal stones.    11/2018 CT showed bilateral punctate, nonobstructing stones.  Recent urine culture was negative for infection, but did show 2-5 rbc/hpf.  She is taking MiraLAX daily for her bowels.  She denies any flank or abdominal pain, dysuria or gross hematuria.     Previous urology records summarized below:  12/2013 CT urogram showed 2 x 4 distal ureteral stone, no filling defects to suggest malignancy.  Cystoscopy was negative.     She did do a 24-hour urine collection on 2/2014 that showed extreme hypercalciuria, borderline low urine pH, and mild hyperuricosuira. George Pina was started on hydrochlorothiazide 12.5 mg daily.     5/21/2019 Right HLL    6/28/2022 - right HLL    Today:  Patient is here today status post right HLL yesterday. She did have her stent removed in the office today. Patient states that she has been having stent discomfort and hematuria. Urine culture 6/27/2022 was negative for infection. Even after stent removal patient was very uncomfortable. She is having significant amount of spasm.       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)     Thyroid disease      Past Surgical History:   Procedure Laterality Date    APPENDECTOMY      BACK SURGERY      CYSTOSCOPY Right 5/21/2019    CYSTOSCOPY URETEROSCOPY LASER-WITH HLL performed by Loree Sexton MD at [facility]  5/21/2019    CYSTOSCOPY URETERAL STENT INSERTION performed by Loree Sexton MD at [facility] Right 6/28/2022    CYSTOSCOPY URETEROSCOPY LASER-HLL performed by Loree Sexton MD at [facility] Right 6/28/2022    CYSTOSCOPY URETERAL STENT INSERTION- performed by Loree Sexton MD at [address] ([address] [place] [address])  1990's    D/T heavy bleeding and clots, still has ovaries.  KNEE SURGERY      bilat x 5    LITHOTRIPSY Right 05/21/2019    Cysto, HLL, stent placement     Outpatient Encounter Medications as of 6/29/2022   Medication Sig Dispense Refill    tamsulosin (FLOMAX) 0.4 MG capsule Take 1 capsule by mouth daily 5 capsule 0    oxybutynin (DITROPAN) 5 MG tablet Take 1 tablet by mouth 3 times daily as needed (Bladder/ureteral spasm) 21 tablet 0    ketorolac (TORADOL) 10 MG tablet Take 1 tablet by mouth every 8 hours as needed for Pain 15 tablet 0    phenazopyridine (PYRIDIUM) 200 MG tablet Take 1 tablet by mouth 3 times daily as needed for Pain (bladder pain) 21 tablet 0    HYDROcodone-acetaminophen (NORCO) 5-325 MG per tablet Take 1 tablet by mouth every 6 hours as needed for Pain for up to 3 days. Intended supply: 3 days. Take lowest dose possible to manage pain 12 tablet 0    HYDROcodone-acetaminophen (NORCO) 5-325 MG per tablet Take 1 tablet by mouth every 6 hours as needed for Pain for up to 3 days. Intended supply: 3 days.  Take lowest dose possible to manage pain 12 tablet 0    ibuprofen (ADVIL) 200 MG CAPS Take 1 capsule by mouth as needed for Fever      traZODone (DESYREL) 50 MG tablet Take 0.5 tablets by mouth nightly (Patient not taking: Reported on 6/28/2022) 30 tablet 0    atorvastatin (LIPITOR) 10 MG tablet Take 1 tablet by mouth daily (Patient not taking: Reported on 5/25/2022) 30 tablet 3    levothyroxine (SYNTHROID) 100 MCG tablet Take 1 tablet by mouth daily (Patient not taking: Reported on 6/29/2022) 30 tablet 5     No facility-administered encounter medications on file as of 6/29/2022. Current Outpatient Medications on File Prior to Visit   Medication Sig Dispense Refill    HYDROcodone-acetaminophen (NORCO) 5-325 MG per tablet Take 1 tablet by mouth every 6 hours as needed for Pain for up to 3 days. Intended supply: 3 days. Take lowest dose possible to manage pain 12 tablet 0    HYDROcodone-acetaminophen (NORCO) 5-325 MG per tablet Take 1 tablet by mouth every 6 hours as needed for Pain for up to 3 days. Intended supply: 3 days. Take lowest dose possible to manage pain 12 tablet 0    ibuprofen (ADVIL) 200 MG CAPS Take 1 capsule by mouth as needed for Fever      traZODone (DESYREL) 50 MG tablet Take 0.5 tablets by mouth nightly (Patient not taking: Reported on 6/28/2022) 30 tablet 0    atorvastatin (LIPITOR) 10 MG tablet Take 1 tablet by mouth daily (Patient not taking: Reported on 5/25/2022) 30 tablet 3    levothyroxine (SYNTHROID) 100 MCG tablet Take 1 tablet by mouth daily (Patient not taking: Reported on 6/29/2022) 30 tablet 5     No current facility-administered medications on file prior to visit. Bactrim [sulfamethoxazole-trimethoprim] and Macrobid [nitrofurantoin monohyd macro]  Family History   Problem Relation Age of Onset    Diabetes Father     Kidney Disease Father     Cancer Father     Other Other         No family h/o ovarian or breast cancer.  Deep Vein Thrombosis Brother      Social History     Tobacco Use   Smoking Status Never Smoker   Smokeless Tobacco Never Used       Social History     Substance and Sexual Activity   Alcohol Use No       Review of Systems   Constitutional: Negative for appetite change, chills and fever.    Eyes: Negative for redness and visual disturbance. Respiratory: Negative for apnea, cough, shortness of breath and wheezing. Cardiovascular: Negative for chest pain and leg swelling. Gastrointestinal: Negative for abdominal pain, constipation, nausea and vomiting. Genitourinary: Positive for flank pain and hematuria. Negative for difficulty urinating, dyspareunia, dysuria, enuresis, frequency, pelvic pain, urgency, vaginal bleeding and vaginal discharge. Musculoskeletal: Negative for back pain, joint swelling and myalgias. Skin: Negative for color change, rash and wound. Neurological: Negative for dizziness, tremors and numbness. Hematological: Negative for adenopathy. Does not bruise/bleed easily. Psychiatric/Behavioral: Negative for sleep disturbance. BP (!) 142/82 (Site: Right Upper Arm, Position: Sitting, Cuff Size: Large Adult)   Temp 97.8 °F (36.6 °C) (Temporal)   Ht 5' 7.75\" (1.721 m)   Wt 183 lb (83 kg)   LMP  (LMP Unknown)   BMI 28.03 kg/m²       PHYSICAL EXAM:  Constitutional: Patient resting comfortably, in no acute distress. Neuro: Alert and oriented to person place and time. Psych: Mood and affect normal.  HEENT: normocephalic, atraumatic  Lungs: Respiratory effort normal, unlabored  Abdomen: Soft, non-tender, non-distended  : No CVA tenderness. Pelvic: deferred     Lab Results   Component Value Date    BUN 20 04/25/2022     Lab Results   Component Value Date    CREATININE 0.77 04/25/2022       ASSESSMENT:   Diagnosis Orders   1. Renal stones  XR ABDOMEN (KUB) (SINGLE AP VIEW)       PLAN:  You may experience waves of pain and/or nausea for the next 24-72 hrs. You may also experience burning with urination, frequency, urgency, bladder spasms, and blood in the urine. All of this should continue to improve over the next several days. The blood in the urine can last up to two weeks.       1) take ibuprofen (motrin) 600 mg (3 of the 200mg tabs) every 6 hours WITH FOOD for the next 72 hours. 2) drink at least 80 oz fluid (water, juice, Gatorade - NOT tea, coffee, soda pop) daily  3) Flomax x5 days script sent , patient is able to tolerate this medication despite her sulfa allergy    She does have Norco at home that she can use for breakthrough pain. Pyridium for bladder pain    Oxybutynin as needed for bladder/ureteral spasm    As needed Toradol, she is aware not take any other NSAIDs with this medication.     Follow-up in 6 weeks with KUB and to discuss stone prevention

## 2022-07-06 ENCOUNTER — TELEPHONE (OUTPATIENT)
Dept: UROLOGY | Age: 62
End: 2022-07-06

## 2022-07-06 ENCOUNTER — OFFICE VISIT (OUTPATIENT)
Dept: UROLOGY | Age: 62
End: 2022-07-06
Payer: COMMERCIAL

## 2022-07-06 ENCOUNTER — HOSPITAL ENCOUNTER (OUTPATIENT)
Dept: CT IMAGING | Age: 62
Discharge: HOME | End: 2022-07-08
Payer: COMMERCIAL

## 2022-07-06 ENCOUNTER — HOSPITAL ENCOUNTER (OUTPATIENT)
Age: 62
Discharge: HOME | End: 2022-07-06
Payer: COMMERCIAL

## 2022-07-06 VITALS
SYSTOLIC BLOOD PRESSURE: 117 MMHG | TEMPERATURE: 97.8 F | DIASTOLIC BLOOD PRESSURE: 78 MMHG | HEIGHT: 67 IN | BODY MASS INDEX: 28.41 KG/M2 | WEIGHT: 181 LBS | HEART RATE: 79 BPM

## 2022-07-06 DIAGNOSIS — K59.09 OTHER CONSTIPATION: ICD-10-CM

## 2022-07-06 DIAGNOSIS — R10.2 PELVIC PRESSURE IN FEMALE: ICD-10-CM

## 2022-07-06 DIAGNOSIS — N20.1 URETERAL CALCULUS, LEFT: ICD-10-CM

## 2022-07-06 DIAGNOSIS — N23 RENAL COLIC: Primary | ICD-10-CM

## 2022-07-06 DIAGNOSIS — N20.1 URETERAL CALCULUS, LEFT: Primary | ICD-10-CM

## 2022-07-06 DIAGNOSIS — N20.0 KIDNEY STONES: ICD-10-CM

## 2022-07-06 DIAGNOSIS — N23 RENAL COLIC: ICD-10-CM

## 2022-07-06 LAB
-: ABNORMAL
ABSOLUTE EOS #: 0.18 K/UL (ref 0–0.44)
ABSOLUTE IMMATURE GRANULOCYTE: 0.11 K/UL (ref 0–0.3)
ABSOLUTE LYMPH #: 1.54 K/UL (ref 1.1–3.7)
ABSOLUTE MONO #: 0.89 K/UL (ref 0.1–1.2)
ANION GAP SERPL CALCULATED.3IONS-SCNC: 11 MMOL/L (ref 9–17)
BACTERIA: ABNORMAL
BASOPHILS # BLD: 1 % (ref 0–2)
BASOPHILS ABSOLUTE: 0.08 K/UL (ref 0–0.2)
BILIRUBIN URINE: NEGATIVE
BUN BLDV-MCNC: 25 MG/DL (ref 8–23)
BUN/CREAT BLD: 30 (ref 9–20)
CALCIUM SERPL-MCNC: 9.5 MG/DL (ref 8.6–10.4)
CHLORIDE BLD-SCNC: 105 MMOL/L (ref 98–107)
CO2: 25 MMOL/L (ref 20–31)
COLOR: YELLOW
CREAT SERPL-MCNC: 0.83 MG/DL (ref 0.5–0.9)
EOSINOPHILS RELATIVE PERCENT: 2 % (ref 1–4)
EPITHELIAL CELLS UA: ABNORMAL /HPF (ref 0–25)
GFR AFRICAN AMERICAN: >60 ML/MIN
GFR NON-AFRICAN AMERICAN: >60 ML/MIN
GFR SERPL CREATININE-BSD FRML MDRD: ABNORMAL ML/MIN/{1.73_M2}
GFR SERPL CREATININE-BSD FRML MDRD: ABNORMAL ML/MIN/{1.73_M2}
GLUCOSE BLD-MCNC: 93 MG/DL (ref 70–99)
GLUCOSE URINE: NEGATIVE
HCT VFR BLD CALC: 41.3 % (ref 36.3–47.1)
HEMOGLOBIN: 13.3 G/DL (ref 11.9–15.1)
IMMATURE GRANULOCYTES: 1 %
KETONES, URINE: NEGATIVE
LEUKOCYTE ESTERASE, URINE: NEGATIVE
LYMPHOCYTES # BLD: 18 % (ref 24–43)
MCH RBC QN AUTO: 30.3 PG (ref 25.2–33.5)
MCHC RBC AUTO-ENTMCNC: 32.2 G/DL (ref 28.4–34.8)
MCV RBC AUTO: 94.1 FL (ref 82.6–102.9)
MONOCYTES # BLD: 11 % (ref 3–12)
MUCUS: ABNORMAL
NITRITE, URINE: NEGATIVE
NRBC AUTOMATED: 0 PER 100 WBC
PDW BLD-RTO: 12.3 % (ref 11.8–14.4)
PH UA: 5.5 (ref 5–9)
PLATELET # BLD: 241 K/UL (ref 138–453)
PMV BLD AUTO: 9.9 FL (ref 8.1–13.5)
POTASSIUM SERPL-SCNC: 4.2 MMOL/L (ref 3.7–5.3)
PROTEIN UA: NEGATIVE
RBC # BLD: 4.39 M/UL (ref 3.95–5.11)
RBC UA: ABNORMAL /HPF (ref 0–2)
SEG NEUTROPHILS: 67 % (ref 36–65)
SEGMENTED NEUTROPHILS ABSOLUTE COUNT: 5.62 K/UL (ref 1.5–8.1)
SODIUM BLD-SCNC: 141 MMOL/L (ref 135–144)
SPECIFIC GRAVITY UA: 1.02 (ref 1.01–1.02)
TURBIDITY: CLEAR
URINE HGB: ABNORMAL
UROBILINOGEN, URINE: NORMAL
WBC # BLD: 8.4 K/UL (ref 3.5–11.3)
WBC UA: ABNORMAL /HPF (ref 0–5)

## 2022-07-06 PROCEDURE — 74176 CT ABD & PELVIS W/O CONTRAST: CPT

## 2022-07-06 PROCEDURE — 36415 COLL VENOUS BLD VENIPUNCTURE: CPT

## 2022-07-06 PROCEDURE — 85025 COMPLETE CBC W/AUTO DIFF WBC: CPT

## 2022-07-06 PROCEDURE — 87086 URINE CULTURE/COLONY COUNT: CPT

## 2022-07-06 PROCEDURE — 80048 BASIC METABOLIC PNL TOTAL CA: CPT

## 2022-07-06 PROCEDURE — 99215 OFFICE O/P EST HI 40 MIN: CPT | Performed by: PHYSICIAN ASSISTANT

## 2022-07-06 PROCEDURE — 51798 US URINE CAPACITY MEASURE: CPT | Performed by: PHYSICIAN ASSISTANT

## 2022-07-06 PROCEDURE — 81001 URINALYSIS AUTO W/SCOPE: CPT

## 2022-07-06 RX ORDER — HYDROCODONE BITARTRATE AND ACETAMINOPHEN 5; 325 MG/1; MG/1
1 TABLET ORAL EVERY 6 HOURS PRN
Qty: 20 TABLET | Refills: 0 | Status: SHIPPED | OUTPATIENT
Start: 2022-07-06 | End: 2022-07-26

## 2022-07-06 RX ORDER — TAMSULOSIN HYDROCHLORIDE 0.4 MG/1
0.4 CAPSULE ORAL DAILY
Qty: 10 CAPSULE | Refills: 0 | Status: SHIPPED | OUTPATIENT
Start: 2022-07-06 | End: 2022-10-26

## 2022-07-06 ASSESSMENT — ENCOUNTER SYMPTOMS
CONSTIPATION: 1
COLOR CHANGE: 0
NAUSEA: 0
EYE REDNESS: 0
SHORTNESS OF BREATH: 0
WHEEZING: 0
BACK PAIN: 0
COUGH: 0
VOMITING: 0
CONSTIPATION: 0
APNEA: 0
ABDOMINAL PAIN: 0

## 2022-07-06 NOTE — TELEPHONE ENCOUNTER
Please let her know that she should take the Toradol first and use the Norco that I sent in for breakthrough pain only

## 2022-07-06 NOTE — PROGRESS NOTES
2022    TELEHEALTH EVALUATION -- Audio/Visual (During [facility]-78 public health emergency)    HPI:    Dayanna Hanley (:  1960) has requested an audio/video evaluation for the following concern(s):    Patient is seen via virtual visit. She was seen earlier in the day with complaints of groin pain/right-sided pelvic pain/flank pain. We did send her for labs. Kidney function and white blood cell count are normal.  Patient did have a urinalysis which did show 2-5 RBCs per high-powered field. She did have a CT abdomen pelvis stone protocol which was independently reviewed showing a 5 mm left distal ureteral stone as well as multiple left renal calculi. Review of Systems   Constitutional: Negative for appetite change, chills and fever. Eyes: Negative for redness and visual disturbance. Respiratory: Negative for apnea, cough, shortness of breath and wheezing. Cardiovascular: Negative for chest pain and leg swelling. Gastrointestinal: Negative for abdominal pain, constipation, nausea and vomiting. Genitourinary: Positive for flank pain. Negative for difficulty urinating, dyspareunia, dysuria, enuresis, frequency, hematuria, pelvic pain, urgency, vaginal bleeding and vaginal discharge. Musculoskeletal: Negative for back pain, joint swelling and myalgias. Skin: Negative for color change, rash and wound. Neurological: Negative for dizziness, tremors and numbness. Hematological: Negative for adenopathy. Does not bruise/bleed easily. Psychiatric/Behavioral: Negative for sleep disturbance. Allergies   Allergen Reactions    Bactrim [Sulfamethoxazole-Trimethoprim] Anaphylaxis    Macrobid [Nitrofurantoin Monohyd Macro] Anaphylaxis       Prior to Visit Medications    Medication Sig Taking?  Authorizing Provider   tamsulosin (FLOMAX) 0.4 MG capsule Take 1 capsule by mouth daily for 10 days Yes Anne Marie Mas PA-C   HYDROcodone-acetaminophen (NORCO) 5-325 MG per tablet Take 1 tablet by mouth every 6 hours as needed for Pain for up to 20 days. Intended supply: 5 days. Take lowest dose possible to manage pain Yes Jessica Mas PA-C   levothyroxine (SYNTHROID) 100 MCG tablet Take 1 tablet by mouth daily  Patient taking differently: Take 88 mcg by mouth daily  Yes NAHOMY Casillas - CNP   ibuprofen (ADVIL) 200 MG CAPS Take 1 capsule by mouth as needed for Fever Yes Historical Provider, MD   ketorolac (TORADOL) 10 MG tablet Take 1 tablet by mouth every 8 hours as needed for Pain  Jessica Mas PA-C       Social History     Tobacco Use    Smoking status: Never Smoker    Smokeless tobacco: Never Used   Vaping Use    Vaping Use: Never used   Substance Use Topics    Alcohol use: No    Drug use: Never        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 ([address]     Thyroid disease        Past Surgical History:   Procedure Laterality Date    APPENDECTOMY      BACK SURGERY      CYSTOSCOPY Right 5/21/2019    CYSTOSCOPY URETEROSCOPY LASER-WITH HLL performed by Talisha Noe MD at [address]  5/21/2019    CYSTOSCOPY URETERAL STENT INSERTION performed by Talisha Noe MD at [address] Right 6/28/2022    CYSTOSCOPY URETEROSCOPY LASER-HLL performed by Talisha Noe MD at [address] Right 6/28/2022    CYSTOSCOPY URETERAL STENT INSERTION- performed by Talisha Noe MD at [address] ([address])  1990's    D/T heavy bleeding and clots, still has ovaries.  KNEE SURGERY      bilat x 5    LITHOTRIPSY Right 05/21/2019    Cysto, HLL, stent placement       Family History   Problem Relation Age of Onset    Diabetes Father     Kidney Disease Father     Cancer Father     Other Other         No family h/o ovarian or breast cancer.      Deep Vein Thrombosis Brother        PHYSICAL EXAMINATION:  Vital Signs: (As obtained by evaluated by a Virtual Visit (video visit) encounter to address concerns as mentioned above. A caregiver was present when appropriate. Due to this being a TeleHealth encounter (During [facility] public health emergency), evaluation of the following organ systems was limited: Vitals/Constitutional/EENT/Resp/CV/GI//MS/Neuro/Skin/Heme-Lymph-Imm. Pursuant to the emergency declaration under the [address] [place] [address] waiver authority and the [employer] Act, this Virtual Visit was conducted with patient's (and/or legal guardian's) consent, to reduce the patient's risk of exposure to COVID-19 and provide necessary medical care. The patient (and/or legal guardian) has also been advised to contact this office for worsening conditions or problems, and seek emergency medical treatment and/or call 911 if deemed necessary. Services were provided through a video synchronous discussion virtually to substitute for in-person clinic visit. The patient was located in their home. The provider was located in the office.     --Tulio Gooden PA-C on 7/6/2022 at 5:07 PM    An electronic signature was used to authenticate this note.

## 2022-07-06 NOTE — TELEPHONE ENCOUNTER
Patient called stating she is having right flank pain. The same pain she had before surgery. She is having urinary frequency. Very uncomfortable feeling all the time. No fever or chills.

## 2022-07-06 NOTE — TELEPHONE ENCOUNTER
Called patient and informed her that surgery can be scheduled for 7/12/22. Patient does agree to be scheduled. Patient is also asking if she can have Norco for pain instead of the Toradol. She says she doesn't like the Toradol.

## 2022-07-06 NOTE — PATIENT INSTRUCTIONS
FOR CONSTIPATION    It is very important to have regular, soft, daily bowel movements, because it WILL improve your urinary symptoms. Take Miralax (or generic equivalent) 17g once daily (one capful). Take every day, DO NOT skip days, as it must be taken daily in order to be effective. DO NOT take just \"as needed\". It is safe to take long term and is recommended for your symptoms. If one dose daily causes loose stools/diarrhea, decrease to 1/2 capful or 1/4 capful. If you cannot tolerate this medication, please notify our office. If one dose daily of Miralax is not sufficient to produce a soft, easy to pass daily stool, you may also add an over-the-counter stool softener capsule. For example: colace (docusate). For Miralax to have maximal effectiveness, be sure to increase your water intake - aim for 80 oz daily unless you are on a fluid-restriction from another provider. SURVEY:    You may be receiving a survey from [employer] regarding your visit today. Please complete the survey to enable us to provide the highest quality of care to you and your family. If you cannot score us a very good on any question, please call the office to discuss how we could have made your experience a very good one. Thank you.

## 2022-07-06 NOTE — PROGRESS NOTES
HPI:    Patient is a 57 y.o. female in no acute distress. She is alert and oriented to person, place, and time. History  Referral from Dr. Jae Avina for flank pain.  Patient has had intermittent flank pain for several years. [facility] does have a history of stones, has never had surgery.  She spends a significant amount of time in [place] and has seen a urologist in [place] in the past. [facility] denies gross hematuria, dysuria, frequency, nocturia, urgency, or incontinence. [facility] does report significant constipation, and has noticed worsening flank and abdominal pain with worsening bowel issues. [facility] will go several days without a bowel movement.  She denies history of frequent urinary tract infections.  She did have a CT scan performed in 2013 that did show nonobstructing bilateral renal stones.      11/2018 CT showed bilateral punctate, nonobstructing stones.  Recent urine culture was negative for infection, but did show 2-5 rbc/hpf.  She is taking MiraLAX daily for her bowels.  She denies any flank or abdominal pain, dysuria or gross hematuria.     Previous urology records summarized below:  12/2013 CT urogram showed 2 x 4 distal ureteral stone, no filling defects to suggest malignancy.  Cystoscopy was negative.     She did do a 24-hour urine collection on 2/2014 that showed extreme hypercalciuria, borderline low urine pH, and mild hyperuricosuira. [facility] was started on hydrochlorothiazide 12.5 mg daily.     5/21/2019 Right HLL    6/28/2022 - right HLL    Today:  Patient is here today 8 days status post right HL L. Patient states that she has right sided pain/groin pain very similar to when she initially was seen prior to her HL L. She states that ever since her surgery she has not been having good bowel movements. She has taken senna with minimal results. She is very uncomfortable. She complains of pelvic pressure. She denies any gross hematuria or dysuria.   During her surgery we were unable to visualize any ureteral stones but patient did have multiple renal calculi which were ablated. We did send her for lab work, urine studies and CAT scan today. Patient was then unable to come back to the office after these were done and therefore she was scheduled for a virtual visit later on today. 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 ([address]     Thyroid disease      Past Surgical History:   Procedure Laterality Date    APPENDECTOMY      BACK SURGERY      CYSTOSCOPY Right 5/21/2019    CYSTOSCOPY URETEROSCOPY LASER-WITH HLL performed by Shayy Moreland MD at [address]  5/21/2019    CYSTOSCOPY URETERAL STENT INSERTION performed by Shayy Moreland MD at [address] Right 6/28/2022    CYSTOSCOPY URETEROSCOPY LASER-HLL performed by Shayy Moreland MD at [address] Right 6/28/2022    CYSTOSCOPY URETERAL STENT INSERTION- performed by Shayy Moreland MD at [address] ([address])  1990's    D/T heavy bleeding and clots, still has ovaries.      KNEE SURGERY      bilat x 5    LITHOTRIPSY Right 05/21/2019    Cysto, HLL, stent placement     Outpatient Encounter Medications as of 7/6/2022   Medication Sig Dispense Refill    levothyroxine (SYNTHROID) 100 MCG tablet Take 1 tablet by mouth daily (Patient taking differently: Take 88 mcg by mouth daily ) 30 tablet 5    ibuprofen (ADVIL) 200 MG CAPS Take 1 capsule by mouth as needed for Fever      tamsulosin (FLOMAX) 0.4 MG capsule Take 1 capsule by mouth daily (Patient not taking: Reported on 7/6/2022) 5 capsule 0    oxybutynin (DITROPAN) 5 MG tablet Take 1 tablet by mouth 3 times daily as needed (Bladder/ureteral spasm) (Patient not taking: Reported on 7/6/2022) 21 tablet 0    ketorolac (TORADOL) 10 MG tablet Take 1 tablet by mouth every 8 hours as needed for Pain 15 tablet 0    traZODone (DESYREL) 50 MG Respiratory: Negative for apnea, cough, shortness of breath and wheezing. Cardiovascular: Negative for chest pain and leg swelling. Gastrointestinal: Positive for constipation. Negative for abdominal pain, nausea and vomiting. Genitourinary: Positive for flank pain and pelvic pain. Negative for difficulty urinating, dyspareunia, dysuria, enuresis, frequency, hematuria, urgency, vaginal bleeding and vaginal discharge. Positive for right groin pain   Musculoskeletal: Negative for back pain, joint swelling and myalgias. Skin: Negative for color change, rash and wound. Neurological: Negative for dizziness, tremors and numbness. Hematological: Negative for adenopathy. Does not bruise/bleed easily. Psychiatric/Behavioral: Negative for sleep disturbance. /78 (Site: Left Upper Arm, Position: Sitting, Cuff Size: Medium Adult)   Pulse 79   Temp 97.8 °F (36.6 °C) (Infrared)   Ht 5' 7\" (1.702 m)   Wt 181 lb (82.1 kg)   LMP  (LMP Unknown)   Breastfeeding No   BMI 28.35 kg/m²       PHYSICAL EXAM:  Constitutional: Patient resting comfortably, in no acute distress. Neuro: Alert and oriented to person place and time. Psych: Mood and affect normal.  HEENT: normocephalic, atraumatic  Lungs: Respiratory effort normal, unlabored  Abdomen: Soft, lower abdominal tenderness  Pelvic: deferred     Lab Results   Component Value Date    BUN 20 04/25/2022     Lab Results   Component Value Date    CREATININE 0.77 04/25/2022       ASSESSMENT:   Diagnosis Orders   1. Renal colic  CT ABDOMEN PELVIS WO CONTRAST Additional Contrast? None    CBC with Auto Differential    Basic Metabolic Panel    Urinalysis with Microscopic    Culture, Urine   2. Pelvic pressure in female  [facility] MEASUREMENT,POST-VOID RESIDUAL VOLUME BY US,NON-IMAGING    Urinalysis with Microscopic    Culture, Urine   3. Kidney stones     4.  Other constipation         PLAN:  Continue Flomax    Start Miralax    Check urinalysis culture we will

## 2022-07-07 LAB
CULTURE: NO GROWTH
SPECIMEN DESCRIPTION: NORMAL

## 2022-07-07 NOTE — PROGRESS NOTES
Patient instructed per phone interview on the pre-operative, intra-operative, and post-operative process as well as NPO status. Instructed to take Synthroid in a.m. of surgery with sip of water only. Pre-operative instruction sheet reviewed with the patient as well as skin prep instructions. Verbalizes understanding.

## 2022-07-11 ENCOUNTER — ANESTHESIA EVENT (OUTPATIENT)
Dept: OPERATING ROOM | Age: 62
End: 2022-07-11
Payer: COMMERCIAL

## 2022-07-12 ENCOUNTER — HOSPITAL ENCOUNTER (OUTPATIENT)
Age: 62
Setting detail: OUTPATIENT SURGERY
Discharge: HOME | End: 2022-07-12
Attending: UROLOGY | Admitting: UROLOGY
Payer: COMMERCIAL

## 2022-07-12 ENCOUNTER — ANESTHESIA (OUTPATIENT)
Dept: OPERATING ROOM | Age: 62
End: 2022-07-12
Payer: COMMERCIAL

## 2022-07-12 ENCOUNTER — APPOINTMENT (OUTPATIENT)
Dept: GENERAL RADIOLOGY | Age: 62
End: 2022-07-12
Attending: UROLOGY
Payer: COMMERCIAL

## 2022-07-12 VITALS
BODY MASS INDEX: 28.25 KG/M2 | TEMPERATURE: 97.6 F | HEART RATE: 69 BPM | HEIGHT: 67 IN | SYSTOLIC BLOOD PRESSURE: 129 MMHG | DIASTOLIC BLOOD PRESSURE: 60 MMHG | OXYGEN SATURATION: 97 % | WEIGHT: 180 LBS | RESPIRATION RATE: 16 BRPM

## 2022-07-12 DIAGNOSIS — N20.1 LEFT URETERAL CALCULUS: ICD-10-CM

## 2022-07-12 PROCEDURE — 2580000003 HC RX 258: Performed by: NURSE ANESTHETIST, CERTIFIED REGISTERED

## 2022-07-12 PROCEDURE — 6360000002 HC RX W HCPCS: Performed by: NURSE ANESTHETIST, CERTIFIED REGISTERED

## 2022-07-12 PROCEDURE — 3600000004 HC SURGERY LEVEL 4 BASE: Performed by: UROLOGY

## 2022-07-12 PROCEDURE — 3700000000 HC ANESTHESIA ATTENDED CARE: Performed by: UROLOGY

## 2022-07-12 PROCEDURE — 7100000011 HC PHASE II RECOVERY - ADDTL 15 MIN: Performed by: UROLOGY

## 2022-07-12 PROCEDURE — 2500000003 HC RX 250 WO HCPCS: Performed by: NURSE ANESTHETIST, CERTIFIED REGISTERED

## 2022-07-12 PROCEDURE — 7100000001 HC PACU RECOVERY - ADDTL 15 MIN: Performed by: UROLOGY

## 2022-07-12 PROCEDURE — 6370000000 HC RX 637 (ALT 250 FOR IP): Performed by: NURSE ANESTHETIST, CERTIFIED REGISTERED

## 2022-07-12 PROCEDURE — 3700000001 HC ADD 15 MINUTES (ANESTHESIA): Performed by: UROLOGY

## 2022-07-12 PROCEDURE — 6370000000 HC RX 637 (ALT 250 FOR IP): Performed by: UROLOGY

## 2022-07-12 PROCEDURE — 2720000010 HC SURG SUPPLY STERILE: Performed by: UROLOGY

## 2022-07-12 PROCEDURE — 2580000003 HC RX 258: Performed by: UROLOGY

## 2022-07-12 PROCEDURE — C1769 GUIDE WIRE: HCPCS | Performed by: UROLOGY

## 2022-07-12 PROCEDURE — 2709999900 HC NON-CHARGEABLE SUPPLY: Performed by: UROLOGY

## 2022-07-12 PROCEDURE — 7100000000 HC PACU RECOVERY - FIRST 15 MIN: Performed by: UROLOGY

## 2022-07-12 PROCEDURE — 82365 CALCULUS SPECTROSCOPY: CPT

## 2022-07-12 PROCEDURE — C2617 STENT, NON-COR, TEM W/O DEL: HCPCS | Performed by: UROLOGY

## 2022-07-12 PROCEDURE — 3600000014 HC SURGERY LEVEL 4 ADDTL 15MIN: Performed by: UROLOGY

## 2022-07-12 PROCEDURE — 3209999900 FLUORO FOR SURGICAL PROCEDURES

## 2022-07-12 PROCEDURE — 6360000002 HC RX W HCPCS: Performed by: UROLOGY

## 2022-07-12 PROCEDURE — 7100000010 HC PHASE II RECOVERY - FIRST 15 MIN: Performed by: UROLOGY

## 2022-07-12 DEVICE — URETERAL STENT WITH SIDE HOLES 6FX26CM
Type: IMPLANTABLE DEVICE | Status: FUNCTIONAL
Brand: TRIA™ FIRM

## 2022-07-12 RX ORDER — ONDANSETRON 2 MG/ML
INJECTION INTRAMUSCULAR; INTRAVENOUS PRN
Status: DISCONTINUED | OUTPATIENT
Start: 2022-07-12 | End: 2022-07-12 | Stop reason: SDUPTHER

## 2022-07-12 RX ORDER — DIMENHYDRINATE 50 MG/1
50 TABLET ORAL ONCE
Status: COMPLETED | OUTPATIENT
Start: 2022-07-12 | End: 2022-07-12

## 2022-07-12 RX ORDER — SODIUM CHLORIDE, SODIUM LACTATE, POTASSIUM CHLORIDE, CALCIUM CHLORIDE 600; 310; 30; 20 MG/100ML; MG/100ML; MG/100ML; MG/100ML
INJECTION, SOLUTION INTRAVENOUS ONCE
Status: COMPLETED | OUTPATIENT
Start: 2022-07-12 | End: 2022-07-12

## 2022-07-12 RX ORDER — PROCHLORPERAZINE EDISYLATE 5 MG/ML
5 INJECTION INTRAMUSCULAR; INTRAVENOUS
Status: DISCONTINUED | OUTPATIENT
Start: 2022-07-12 | End: 2022-07-12 | Stop reason: HOSPADM

## 2022-07-12 RX ORDER — HYDROCODONE BITARTRATE AND ACETAMINOPHEN 5; 325 MG/1; MG/1
1 TABLET ORAL
Status: COMPLETED | OUTPATIENT
Start: 2022-07-12 | End: 2022-07-12

## 2022-07-12 RX ORDER — FENTANYL CITRATE 50 UG/ML
50 INJECTION, SOLUTION INTRAMUSCULAR; INTRAVENOUS EVERY 5 MIN PRN
Status: DISCONTINUED | OUTPATIENT
Start: 2022-07-12 | End: 2022-07-12 | Stop reason: HOSPADM

## 2022-07-12 RX ORDER — SODIUM CHLORIDE, SODIUM LACTATE, POTASSIUM CHLORIDE, CALCIUM CHLORIDE 600; 310; 30; 20 MG/100ML; MG/100ML; MG/100ML; MG/100ML
INJECTION, SOLUTION INTRAVENOUS CONTINUOUS
Status: DISCONTINUED | OUTPATIENT
Start: 2022-07-12 | End: 2022-07-12 | Stop reason: HOSPADM

## 2022-07-12 RX ORDER — ONDANSETRON 2 MG/ML
4 INJECTION INTRAMUSCULAR; INTRAVENOUS
Status: COMPLETED | OUTPATIENT
Start: 2022-07-12 | End: 2022-07-12

## 2022-07-12 RX ORDER — KETOROLAC TROMETHAMINE 30 MG/ML
INJECTION, SOLUTION INTRAMUSCULAR; INTRAVENOUS PRN
Status: DISCONTINUED | OUTPATIENT
Start: 2022-07-12 | End: 2022-07-12 | Stop reason: SDUPTHER

## 2022-07-12 RX ORDER — FENTANYL CITRATE 50 UG/ML
INJECTION, SOLUTION INTRAMUSCULAR; INTRAVENOUS PRN
Status: DISCONTINUED | OUTPATIENT
Start: 2022-07-12 | End: 2022-07-12 | Stop reason: SDUPTHER

## 2022-07-12 RX ORDER — LIDOCAINE HYDROCHLORIDE 20 MG/ML
INJECTION, SOLUTION EPIDURAL; INFILTRATION; INTRACAUDAL; PERINEURAL PRN
Status: DISCONTINUED | OUTPATIENT
Start: 2022-07-12 | End: 2022-07-12 | Stop reason: SDUPTHER

## 2022-07-12 RX ORDER — LIDOCAINE HYDROCHLORIDE 20 MG/ML
JELLY TOPICAL PRN
Status: DISCONTINUED | OUTPATIENT
Start: 2022-07-12 | End: 2022-07-12 | Stop reason: ALTCHOICE

## 2022-07-12 RX ORDER — DEXAMETHASONE SODIUM PHOSPHATE 4 MG/ML
INJECTION, SOLUTION INTRA-ARTICULAR; INTRALESIONAL; INTRAMUSCULAR; INTRAVENOUS; SOFT TISSUE PRN
Status: DISCONTINUED | OUTPATIENT
Start: 2022-07-12 | End: 2022-07-12 | Stop reason: SDUPTHER

## 2022-07-12 RX ORDER — HYDROCODONE BITARTRATE AND ACETAMINOPHEN 5; 325 MG/1; MG/1
2 TABLET ORAL
Status: COMPLETED | OUTPATIENT
Start: 2022-07-12 | End: 2022-07-12

## 2022-07-12 RX ORDER — FENTANYL CITRATE 50 UG/ML
25 INJECTION, SOLUTION INTRAMUSCULAR; INTRAVENOUS EVERY 5 MIN PRN
Status: DISCONTINUED | OUTPATIENT
Start: 2022-07-12 | End: 2022-07-12 | Stop reason: HOSPADM

## 2022-07-12 RX ORDER — PROPOFOL 10 MG/ML
INJECTION, EMULSION INTRAVENOUS PRN
Status: DISCONTINUED | OUTPATIENT
Start: 2022-07-12 | End: 2022-07-12 | Stop reason: SDUPTHER

## 2022-07-12 RX ORDER — ACETAMINOPHEN 325 MG/1
650 TABLET ORAL ONCE
Status: COMPLETED | OUTPATIENT
Start: 2022-07-12 | End: 2022-07-12

## 2022-07-12 RX ORDER — CIPROFLOXACIN 2 MG/ML
400 INJECTION, SOLUTION INTRAVENOUS
Status: COMPLETED | OUTPATIENT
Start: 2022-07-12 | End: 2022-07-12

## 2022-07-12 RX ADMIN — CIPROFLOXACIN 400 MG: 2 INJECTION, SOLUTION INTRAVENOUS at 11:39

## 2022-07-12 RX ADMIN — ACETAMINOPHEN 650 MG: 325 TABLET ORAL at 10:50

## 2022-07-12 RX ADMIN — PROPOFOL 160 MG: 10 INJECTION, EMULSION INTRAVENOUS at 11:46

## 2022-07-12 RX ADMIN — DIMENHYDRINATE 50 MG: 50 TABLET ORAL at 10:50

## 2022-07-12 RX ADMIN — KETOROLAC TROMETHAMINE 30 MG: 30 INJECTION, SOLUTION INTRAMUSCULAR at 11:58

## 2022-07-12 RX ADMIN — SODIUM CHLORIDE, POTASSIUM CHLORIDE, SODIUM LACTATE AND CALCIUM CHLORIDE: 600; 310; 30; 20 INJECTION, SOLUTION INTRAVENOUS at 10:49

## 2022-07-12 RX ADMIN — LIDOCAINE HYDROCHLORIDE 80 MG: 20 INJECTION, SOLUTION EPIDURAL; INFILTRATION; INTRACAUDAL; PERINEURAL at 11:46

## 2022-07-12 RX ADMIN — FENTANYL CITRATE 50 MCG: 50 INJECTION INTRAMUSCULAR; INTRAVENOUS at 12:01

## 2022-07-12 RX ADMIN — ONDANSETRON 4 MG: 2 INJECTION INTRAMUSCULAR; INTRAVENOUS at 11:51

## 2022-07-12 RX ADMIN — DEXAMETHASONE SODIUM PHOSPHATE 4 MG: 4 INJECTION, SOLUTION INTRAMUSCULAR; INTRAVENOUS at 11:51

## 2022-07-12 RX ADMIN — FENTANYL CITRATE 50 MCG: 50 INJECTION INTRAMUSCULAR; INTRAVENOUS at 11:45

## 2022-07-12 RX ADMIN — SODIUM CHLORIDE, POTASSIUM CHLORIDE, SODIUM LACTATE AND CALCIUM CHLORIDE: 600; 310; 30; 20 INJECTION, SOLUTION INTRAVENOUS at 12:17

## 2022-07-12 RX ADMIN — ONDANSETRON 4 MG: 2 INJECTION INTRAMUSCULAR; INTRAVENOUS at 12:51

## 2022-07-12 RX ADMIN — FENTANYL CITRATE 50 MCG: 50 INJECTION, SOLUTION INTRAMUSCULAR; INTRAVENOUS at 12:46

## 2022-07-12 RX ADMIN — HYDROCODONE BITARTRATE AND ACETAMINOPHEN 2 TABLET: 5; 325 TABLET ORAL at 13:30

## 2022-07-12 RX ADMIN — SODIUM CHLORIDE, POTASSIUM CHLORIDE, SODIUM LACTATE AND CALCIUM CHLORIDE: 600; 310; 30; 20 INJECTION, SOLUTION INTRAVENOUS at 10:42

## 2022-07-12 ASSESSMENT — PAIN DESCRIPTION - ORIENTATION: ORIENTATION: LEFT

## 2022-07-12 ASSESSMENT — PAIN SCALES - GENERAL
PAINLEVEL_OUTOF10: 8
PAINLEVEL_OUTOF10: 6
PAINLEVEL_OUTOF10: 5
PAINLEVEL_OUTOF10: 8
PAINLEVEL_OUTOF10: 0
PAINLEVEL_OUTOF10: 8

## 2022-07-12 ASSESSMENT — PAIN DESCRIPTION - LOCATION: LOCATION: ABDOMEN

## 2022-07-12 ASSESSMENT — PAIN - FUNCTIONAL ASSESSMENT: PAIN_FUNCTIONAL_ASSESSMENT: 0-10

## 2022-07-12 ASSESSMENT — PAIN DESCRIPTION - DESCRIPTORS: DESCRIPTORS: CRAMPING

## 2022-07-12 NOTE — ANESTHESIA POSTPROCEDURE EVALUATION
Department of Anesthesiology  Postprocedure Note    Patient: Opal Ernandez  MRN: [id number]  YOB: 1960  Date of evaluation: 7/12/2022      Procedure Summary     Date: 07/12/22 Room / Location: [facility]    Anesthesia Start: 8263 Anesthesia Stop: 0377    Procedures:       CYSTOSCOPY URETEROSCOPY LASER-HLL (Left )      CYSTOSCOPY URETERAL STENT INSERTION (Left ) Diagnosis:       Left ureteral calculus      (LEFT URETERAL [address] [facility])    Surgeons: Britton Zimmerman MD Responsible Provider: NAHOMY Silveira CRNA    Anesthesia Type: general ASA Status: 2          Anesthesia Type: No value filed.     Parag Phase I: Parag Score: 10    Parag Phase II: Parag Score: 10      Anesthesia Post Evaluation    Patient location during evaluation: bedside  Patient participation: complete - patient participated  Level of consciousness: awake and alert  Pain score: 7  Airway patency: patent  Nausea & Vomiting: no nausea and no vomiting  Complications: no  Cardiovascular status: hemodynamically stable  Respiratory status: acceptable  Hydration status: stable

## 2022-07-12 NOTE — FLOWSHEET NOTE

## 2022-07-12 NOTE — INTERVAL H&P NOTE
Here for left HLL    History and Physical reviewed  I have examined the patient and no changes    Alexis Fernandez MD

## 2022-07-12 NOTE — ANESTHESIA PRE PROCEDURE
Department of Anesthesiology  Preprocedure Note       Name:  Willa Amos   Age:  57 y.o.  :  1960                                          MRN:  [id number]         Date:  2022      Surgeon: Demarco Wood):  Renetta Santiago MD    Procedure: Procedure(s):  CYSTOSCOPY URETEROSCOPY LASER-HLL  CYSTOSCOPY URETERAL STENT INSERTION    Medications prior to admission:   Prior to Admission medications    Medication Sig Start Date End Date Taking? Authorizing Provider   levothyroxine (SYNTHROID) 88 MCG tablet TAKE ONE TABLET BY MOUTH DAILY 22   NAHOMY Feliciano CNP   tamsulosin [facility]) 0.4 MG capsule Take 1 capsule by mouth daily for 10 days 22  Amadeo Mas PA-C   HYDROcodone-acetaminophen (NORCO) 5-325 MG per tablet Take 1 tablet by mouth every 6 hours as needed for Pain for up to 20 days. Intended supply: 5 days. Take lowest dose possible to manage pain 22  Amadeo Mas PA-C   ketorolac (TORADOL) 10 MG tablet Take 1 tablet by mouth every 8 hours as needed for Pain 22  mAadeo Mas PA-C   levothyroxine (SYNTHROID) 100 MCG tablet Take 1 tablet by mouth daily  Patient taking differently: Take 88 mcg by mouth daily  22   NAHOMY Feliciano CNP   ibuprofen (ADVIL) 200 MG CAPS Take 1 capsule by mouth as needed for Fever    Historical Provider, MD       Current medications:    No current facility-administered medications for this visit. No current outpatient medications on file. Facility-Administered Medications Ordered in Other Visits   Medication Dose Route Frequency Provider Last Rate Last Admin    lactated ringers infusion   IntraVENous Continuous Renetta Santiago MD        ciprofloxacin (CIPRO) IVPB 400 mg  400 mg IntraVENous On Call to Emma Gibbs MD           Allergies:     Allergies   Allergen Reactions    Bactrim [Sulfamethoxazole-Trimethoprim] Anaphylaxis    Macrobid [Nitrofurantoin Monohyd Macro] Anaphylaxis Problem List:    Patient Active Problem List   Diagnosis Code    Renal colic S75    Constipation K59.00    Renal stones N20.0    Ureteral calculus N20.1    COVID-19 virus infection /  2020 U07.1    Hyperthyroidism E05.90    Chronic right hip pain M25.551, G89.29    Mixed hyperlipidemia E78.2       Past Medical History:        Diagnosis Date    High cholesterol     Pineal hyperplasia, 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 ([address]     Thyroid disease        Past Surgical History:        Procedure Laterality Date    APPENDECTOMY      BACK SURGERY      CYSTOSCOPY Right 5/21/2019    CYSTOSCOPY URETEROSCOPY LASER-WITH HLL performed by Don Wiggins MD at [place] [address]  5/21/2019    CYSTOSCOPY URETERAL STENT INSERTION performed by Don Wiggins MD at [facility] Right 6/28/2022    CYSTOSCOPY URETEROSCOPY LASER-HLL performed by Don Wiggins MD at [place] [address] Right 6/28/2022    CYSTOSCOPY URETERAL STENT INSERTION- performed by Don Wiggins MD at [address] [place] [address] ([address] [place] [address])  1990's    D/T heavy bleeding and clots, still has ovaries.  KNEE SURGERY      bilat x 5    LITHOTRIPSY Right 05/21/2019    Cysto, HLL, stent placement       Social History:    Social History     Tobacco Use    Smoking status: Never Smoker    Smokeless tobacco: Never Used   Substance Use Topics    Alcohol use: No                                Counseling given: Not Answered      Vital Signs (Current): There were no vitals filed for this visit.                                            BP Readings from Last 3 Encounters:   07/12/22 (!) 145/78   07/06/22 117/78   06/29/22 (!) 142/82       NPO Status:                                                                                 BMI:   Wt Readings from Last 3 Encounters:   07/12/22 180 lb (81.6 kg)   07/06/22 181 lb (82.1 kg)   06/29/22 183 lb (83 kg)     There is no height or weight on file to calculate BMI.    CBC:   Lab Results   Component Value Date/Time    WBC 8.4 07/06/2022 11:44 AM    RBC 4.39 07/06/2022 11:44 AM    RBC 4.23 04/25/2022 07:40 AM    HGB 13.3 07/06/2022 11:44 AM    HCT 41.3 07/06/2022 11:44 AM    MCV 94.1 07/06/2022 11:44 AM    RDW 12.3 07/06/2022 11:44 AM     07/06/2022 11:44 AM       CMP:   Lab Results   Component Value Date/Time     07/06/2022 11:44 AM    K 4.2 07/06/2022 11:44 AM     07/06/2022 11:44 AM    CO2 25 07/06/2022 11:44 AM    BUN 25 07/06/2022 11:44 AM    CREATININE 0.83 07/06/2022 11:44 AM    GFRAA >60 07/06/2022 11:44 AM    LABGLOM >60 07/06/2022 11:44 AM    GLUCOSE 93 07/06/2022 11:44 AM    GLUCOSE 92 04/25/2022 07:40 AM    PROT 6.7 04/25/2022 07:40 AM    CALCIUM 9.5 07/06/2022 11:44 AM    BILITOT 0.7 04/25/2022 07:40 AM    ALKPHOS 87 04/25/2022 07:40 AM    ALKPHOS 94 08/07/2020 12:00 AM    AST 24 04/25/2022 07:40 AM    ALT 22 04/25/2022 07:40 AM       POC Tests: No results for input(s): POCGLU, POCNA, POCK, POCCL, POCBUN, POCHEMO, POCHCT in the last 72 hours.     Coags: No results found for: PROTIME, INR, APTT    HCG (If Applicable): No results found for: PREGTESTUR, PREGSERUM, HCG, HCGQUANT     ABGs: No results found for: PHART, PO2ART, OBL7QCO, ATB4RSV, BEART, K2QWGQNC     Type & Screen (If Applicable):  No results found for: LABABO, LABRH    Drug/Infectious Status (If Applicable):  No results found for: HIV, HEPCAB    COVID-19 Screening (If Applicable): No results found for: COVID19        Anesthesia Evaluation  Patient summary reviewed and Nursing notes reviewed no history of anesthetic complications:   Airway: Mallampati: I  TM distance: >3 FB   Neck ROM: full  Mouth opening: > = 3 FB   Dental: normal exam         Pulmonary:Negative Pulmonary ROS and normal exam                               Cardiovascular:  Exercise tolerance: good (>4 METS),   (+) hyperlipidemia      ECG reviewed                        Neuro/Psych:   Negative Neuro/Psych ROS              GI/Hepatic/Renal:   (+) renal disease: kidney stones,           Endo/Other:    (+) hypothyroidism::., .                 Abdominal:             Vascular: negative vascular ROS. Other Findings:             Anesthesia Plan      general     ASA 2       Induction: intravenous. MIPS: Postoperative opioids intended and Prophylactic antiemetics administered. Anesthetic plan and risks discussed with patient. Plan discussed with CRNA.                     NAHOMY Blake - ENE   7/12/2022

## 2022-07-13 ENCOUNTER — OFFICE VISIT (OUTPATIENT)
Dept: UROLOGY | Age: 62
End: 2022-07-13
Payer: COMMERCIAL

## 2022-07-13 ENCOUNTER — HOSPITAL ENCOUNTER (EMERGENCY)
Age: 62
Discharge: HOME | End: 2022-07-13
Payer: COMMERCIAL

## 2022-07-13 ENCOUNTER — HOSPITAL ENCOUNTER (OUTPATIENT)
Age: 62
Setting detail: SPECIMEN
Discharge: HOME | End: 2022-07-13
Payer: COMMERCIAL

## 2022-07-13 VITALS
SYSTOLIC BLOOD PRESSURE: 102 MMHG | HEART RATE: 92 BPM | OXYGEN SATURATION: 97 % | TEMPERATURE: 98.7 F | RESPIRATION RATE: 18 BRPM | DIASTOLIC BLOOD PRESSURE: 80 MMHG

## 2022-07-13 VITALS
DIASTOLIC BLOOD PRESSURE: 64 MMHG | BODY MASS INDEX: 28.82 KG/M2 | WEIGHT: 184 LBS | TEMPERATURE: 97.3 F | SYSTOLIC BLOOD PRESSURE: 127 MMHG

## 2022-07-13 DIAGNOSIS — N20.1 URETERAL STONE: Primary | ICD-10-CM

## 2022-07-13 DIAGNOSIS — R30.0 DYSURIA: ICD-10-CM

## 2022-07-13 DIAGNOSIS — Z98.890 HISTORY OF REMOVAL OF URETERAL STENT: ICD-10-CM

## 2022-07-13 DIAGNOSIS — R10.9 LEFT FLANK PAIN: Primary | ICD-10-CM

## 2022-07-13 LAB
-: ABNORMAL
BILIRUBIN URINE: NEGATIVE
COLOR: ABNORMAL
EPITHELIAL CELLS UA: ABNORMAL /HPF (ref 0–25)
GLUCOSE URINE: NEGATIVE
KETONES, URINE: NEGATIVE
LEUKOCYTE ESTERASE, URINE: ABNORMAL
NITRITE, URINE: NEGATIVE
PH UA: 6.5 (ref 5–9)
PROTEIN UA: ABNORMAL
RBC UA: ABNORMAL /HPF (ref 0–2)
SPECIFIC GRAVITY UA: 1.02 (ref 1.01–1.02)
TURBIDITY: CLEAR
URINE HGB: ABNORMAL
UROBILINOGEN, URINE: NORMAL
WBC UA: ABNORMAL /HPF (ref 0–5)

## 2022-07-13 PROCEDURE — 6370000000 HC RX 637 (ALT 250 FOR IP): Performed by: PHYSICIAN ASSISTANT

## 2022-07-13 PROCEDURE — 81001 URINALYSIS AUTO W/SCOPE: CPT

## 2022-07-13 PROCEDURE — 87086 URINE CULTURE/COLONY COUNT: CPT

## 2022-07-13 PROCEDURE — 99213 OFFICE O/P EST LOW 20 MIN: CPT | Performed by: PHYSICIAN ASSISTANT

## 2022-07-13 PROCEDURE — 99283 EMERGENCY DEPT VISIT LOW MDM: CPT

## 2022-07-13 RX ORDER — DIAZEPAM 5 MG/1
5 TABLET ORAL ONCE
Status: COMPLETED | OUTPATIENT
Start: 2022-07-13 | End: 2022-07-13

## 2022-07-13 RX ORDER — CEPHALEXIN 500 MG/1
500 CAPSULE ORAL 2 TIMES DAILY
Qty: 14 CAPSULE | Refills: 0 | Status: SHIPPED | OUTPATIENT
Start: 2022-07-13 | End: 2022-07-20

## 2022-07-13 RX ORDER — DIAZEPAM 5 MG/1
5 TABLET ORAL ONCE
Status: DISCONTINUED | OUTPATIENT
Start: 2022-07-13 | End: 2022-07-13 | Stop reason: HOSPADM

## 2022-07-13 RX ADMIN — DIAZEPAM 5 MG: 5 TABLET ORAL at 18:48

## 2022-07-13 ASSESSMENT — ENCOUNTER SYMPTOMS
APNEA: 0
WHEEZING: 0
ABDOMINAL PAIN: 0
NAUSEA: 1
COLOR CHANGE: 0
ABDOMINAL PAIN: 0
COUGH: 0
VOMITING: 0
BACK PAIN: 0
CONSTIPATION: 0
NAUSEA: 0
RESPIRATORY NEGATIVE: 1
SHORTNESS OF BREATH: 0
EYE REDNESS: 0

## 2022-07-13 ASSESSMENT — PAIN SCALES - GENERAL
PAINLEVEL_OUTOF10: 4
PAINLEVEL_OUTOF10: 10

## 2022-07-13 ASSESSMENT — PAIN DESCRIPTION - LOCATION
LOCATION: FLANK
LOCATION: FLANK

## 2022-07-13 ASSESSMENT — PAIN - FUNCTIONAL ASSESSMENT: PAIN_FUNCTIONAL_ASSESSMENT: 0-10

## 2022-07-13 ASSESSMENT — PAIN DESCRIPTION - DESCRIPTORS: DESCRIPTORS: SPASM

## 2022-07-13 ASSESSMENT — PAIN DESCRIPTION - ORIENTATION
ORIENTATION: LEFT
ORIENTATION: LEFT

## 2022-07-13 NOTE — PROGRESS NOTES
HPI:    Patient is a 57 y.o. female in no acute distress. She is alert and oriented to person, place, and time. History  Referral from Dr. Khushbu Barrios for flank pain.  Patient has had intermittent flank pain for several years. Faith Burgess does have a history of stones, has never had surgery.  She spends a significant amount of time in [place] and has seen a urologist in [place] in the past. Faith Burgess denies gross hematuria, dysuria, frequency, nocturia, urgency, or incontinence. Faith Burgess does report significant constipation, and has noticed worsening flank and abdominal pain with worsening bowel issues. Faith Burgess will go several days without a bowel movement.  She denies history of frequent urinary tract infections.  She did have a CT scan performed in 2013 that did show nonobstructing bilateral renal stones.      11/2018 CT showed bilateral punctate, nonobstructing stones.  Recent urine culture was negative for infection, but did show 2-5 rbc/hpf.  She is taking MiraLAX daily for her bowels.  She denies any flank or abdominal pain, dysuria or gross hematuria.     Previous urology records summarized below:  12/2013 CT urogram showed 2 x 4 distal ureteral stone, no filling defects to suggest malignancy.  Cystoscopy was negative.     She did do a 24-hour urine collection on 2/2014 that showed extreme hypercalciuria, borderline low urine pH, and mild hyperuricosuira. Faith Burgess was started on hydrochlorothiazide 12.5 mg daily.     5/21/2019 Right HLL    6/28/2022 - right HLL    7/12/2022 - left HLL    Today:  Patient is here today status post left HLL. Patient states that she has hematuria and burning. She continues to take Flomax. She is taking Toradol as needed for pain. She did have her stent removed today in the office. She tolerated this well.     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 ([address]     Thyroid disease      Past Surgical History:   Procedure Laterality Date    APPENDECTOMY      BACK SURGERY      CYSTOSCOPY Right 5/21/2019    CYSTOSCOPY URETEROSCOPY LASER-WITH HLL performed by Daisy Guzman MD at [place] [address]  5/21/2019    CYSTOSCOPY URETERAL STENT INSERTION performed by Daisy Guzman MD at [place] [address] Right 6/28/2022    CYSTOSCOPY URETEROSCOPY LASER-HLL performed by Daisy Guzman MD at [place] [address] Right 6/28/2022    CYSTOSCOPY URETERAL STENT INSERTION- performed by Daisy Guzman MD at [place] [address] Left 7/12/2022    CYSTOSCOPY URETEROSCOPY LASER-HLL performed by Daisy Guzman MD at [place] [address] Left 7/12/2022    CYSTOSCOPY URETERAL STENT INSERTION performed by Daisy Guzman MD at [address] ([address])  1990's    D/T heavy bleeding and clots, still has ovaries.  KNEE SURGERY      bilat x 5    LITHOTRIPSY Right 05/21/2019    Cysto, HLL, stent placement     Outpatient Encounter Medications as of 7/13/2022   Medication Sig Dispense Refill    cephALEXin (KEFLEX) 500 MG capsule Take 1 capsule by mouth 2 times daily for 7 days 14 capsule 0    levothyroxine (SYNTHROID) 88 MCG tablet TAKE ONE TABLET BY MOUTH DAILY 180 tablet 3    tamsulosin (FLOMAX) 0.4 MG capsule Take 1 capsule by mouth daily for 10 days 10 capsule 0    HYDROcodone-acetaminophen (NORCO) 5-325 MG per tablet Take 1 tablet by mouth every 6 hours as needed for Pain for up to 20 days. Intended supply: 5 days. Take lowest dose possible to manage pain 20 tablet 0    levothyroxine (SYNTHROID) 100 MCG tablet Take 1 tablet by mouth daily (Patient taking differently: Take 88 mcg by mouth daily ) 30 tablet 5    ibuprofen (ADVIL) 200 MG CAPS Take 1 capsule by mouth as needed for Fever      ketorolac (TORADOL) 10 MG tablet Take 1 tablet by mouth every 8 hours as needed for Pain 15 tablet 0     No facility-administered encounter medications on file as of 7/13/2022. Current Outpatient Medications on File Prior to Visit   Medication Sig Dispense Refill    levothyroxine (SYNTHROID) 88 MCG tablet TAKE ONE TABLET BY MOUTH DAILY 180 tablet 3    tamsulosin (FLOMAX) 0.4 MG capsule Take 1 capsule by mouth daily for 10 days 10 capsule 0    HYDROcodone-acetaminophen (NORCO) 5-325 MG per tablet Take 1 tablet by mouth every 6 hours as needed for Pain for up to 20 days. Intended supply: 5 days. Take lowest dose possible to manage pain 20 tablet 0    levothyroxine (SYNTHROID) 100 MCG tablet Take 1 tablet by mouth daily (Patient taking differently: Take 88 mcg by mouth daily ) 30 tablet 5    ibuprofen (ADVIL) 200 MG CAPS Take 1 capsule by mouth as needed for Fever      ketorolac (TORADOL) 10 MG tablet Take 1 tablet by mouth every 8 hours as needed for Pain 15 tablet 0     No current facility-administered medications on file prior to visit. Bactrim [sulfamethoxazole-trimethoprim] and Macrobid [nitrofurantoin monohyd macro]  Family History   Problem Relation Age of Onset    Diabetes Father     Kidney Disease Father     Cancer Father     Other Other         No family h/o ovarian or breast cancer.  Deep Vein Thrombosis Brother      Social History     Tobacco Use   Smoking Status Never Smoker   Smokeless Tobacco Never Used       Social History     Substance and Sexual Activity   Alcohol Use No       Review of Systems   Constitutional: Negative for appetite change, chills and fever. Eyes: Negative for redness and visual disturbance. Respiratory: Negative for apnea, cough, shortness of breath and wheezing. Cardiovascular: Negative for chest pain and leg swelling. Gastrointestinal: Negative for abdominal pain, constipation, nausea and vomiting. Genitourinary: Positive for dysuria and hematuria. Negative for difficulty urinating, dyspareunia, enuresis, flank pain, frequency, pelvic pain, urgency, vaginal bleeding and vaginal discharge.    Musculoskeletal: Negative for back pain, joint swelling and myalgias. Skin: Negative for color change, rash and wound. Neurological: Negative for dizziness, tremors and numbness. Hematological: Negative for adenopathy. Does not bruise/bleed easily. Psychiatric/Behavioral: Negative for sleep disturbance. /64 (Site: Right Upper Arm, Position: Sitting, Cuff Size: Medium Adult)   Temp 97.3 °F (36.3 °C) (Infrared)   Wt 184 lb (83.5 kg)   LMP  (LMP Unknown)   Breastfeeding No   BMI 28.82 kg/m²       PHYSICAL EXAM:  Constitutional: Patient resting comfortably, in no acute distress. Neuro: Alert and oriented to person place and time. Cranial nerves grossly intact. Psych: Mood and affect normal.  HEENT: normocephalic, atraumatic  Lungs: Respiratory effort normal, unlabored  Abdomen: Soft, non-tender, non-distended  : No CVA tenderness. Pelvic: deferred     Lab Results   Component Value Date    BUN 25 (H) 07/06/2022     Lab Results   Component Value Date    CREATININE 0.83 07/06/2022       ASSESSMENT:   Diagnosis Orders   1. Ureteral stone  XR ABDOMEN (KUB) (SINGLE AP VIEW)   2. Dysuria  Culture, Urine    cephALEXin (KEFLEX) 500 MG capsule       PLAN:  We will check a urine culture today. We will call her with results. We will stop antibiotics if urine culture is negative    Empiric Keflex    You may experience waves of pain and/or nausea for the next 24-72 hrs. You may also experience burning with urination, frequency, urgency, bladder spasms, and blood in the urine. All of this should continue to improve over the next several days. The blood in the urine can last up to two weeks. 1) take ibuprofen (motrin) 600 mg (3 of the 200mg tabs) every 6 hours WITH FOOD for the next 72 hours. 2) take Flomax for the next 72 hours.   3) drink at least 80 oz fluid (water, juice, Gatorade - NOT tea, coffee, soda pop) daily    Call our office [phone] or go to ER (if after normal office hours) if you develop fever, intractable vomiting, severe/intolerable pain.      Follow-up in 6 weeks with KUB

## 2022-07-13 NOTE — OP NOTE
[address]                                OPERATIVE REPORT    PATIENT NAME: Jia Covington                  :        1960  MED REC NO:   [id number]                              ROOM:  ACCOUNT NO:   [de-identified]                           ADMIT DATE: 2022  PROVIDER:     Daniela Elias    DATE OF PROCEDURE:  2022    SURGEON:  Dr. Daniela Elias. ASSISTANT:  None. PREOPERATIVE DIAGNOSES:  1. Left ureteral calculus. 2.  Left renal calculus. POSTOPERATIVE DIAGNOSES:  1. Left ureteral calculus. 2.  Left renal calculus. PROCEDURE PERFORMED:  Cystoscopy, left ureteroscopy, left holmium laser  lithotripsy, and left ureteral stent placement. ANESTHESIA:  General.    COMPLICATIONS:  None. ESTIMATED BLOOD LOSS:  Minimal.    SPECIMENS:  Ureteral calculus. PROSTHESIS:  A 6-Chinese x 26 cm double-J ureteral stent. DISPOSITION:  Stable. FINDINGS:  1.  A 5 mm distal left ureteral stone. 2.  Multiple left renal stones. INDICATIONS:  The patient is a 62-year-old female with extensive stone  history, here now after CT-proven left renal calculus. DESCRIPTION OF PROCEDURE:  The patient was taken back to the operating  room after informed consent including all risks, benefits, and  alternatives were obtained. The patient was transferred from the [facility]  onto the operating room table, where she was induced under general  anesthesia. She was given IV Cipro for preoperative antibiotic  prophylaxis. To begin the case, she was prepped and draped in the  normal sterile fashion, and placed in dorsal lithotomy. She had a  22-Chinese sheath with a 30-degree lens passed through the urethra into  the bladder. Once in the bladder, we identified the left ureteral  orifice. A 0.035-inch wire was passed up.   We were then able to place a  semi-rigid ureteroscope up, where we identified a stone in the distal  ureter. We were able to use 270 micron laser fiber and ablate the stone  adequately. We did use a stone basket to remove the large portion of  this calculus. This was passed to the back table _____ permanent  analysis. At this point in time, we replaced the rigid ureteroscope  with the flexible ureteroscope and worked our way into the kidney. Formal pyeloscopy was performed. We did spend an extensive amount of  time ablating calculi as she had calculi in most of her calices. Some  of these were detached and some of these were attached _____. We did  systematically go through spending extensive amount of time lasering at  least 10 to 12 stones. At this point in time, we removed the scope  leaving her Glidewire in place. We then replaced the cystoscope over  the Glidewire and placed a 6-Emirati x 26 cm double-J ureteral stent over  the Glidewire up into the kidney. Glidewire was removed. Proximal curl  was confirmed via fluoroscopy and distal curl was confirmed via  visualization. At this point in time, she was awoken from general  anesthesia, transferred to the [facility], and taken to the PACU in  satisfactory condition by Nursing and Anesthesia Teams. PLAN:  The patient will be discharged home per PACU criterion and follow  up with us in one day for stent removal via string.         Clarita Burnett    D: 07/12/2022 15:20:36       T: 07/12/2022 23:35:04     MONSE_AMOR  Job#: [id number]     Doc#: [id number]    CC:

## 2022-07-13 NOTE — PATIENT INSTRUCTIONS
You may experience waves of pain and/or nausea for the next 24-72 hrs. You may also experience burning with urination, frequency, urgency, bladder spasms, and blood in the urine. All of this should continue to improve over the next several days. The blood in the urine can last up to two weeks. 1) take ibuprofen (motrin) 600 mg (3 of the 200mg tabs) every 6 hours WITH FOOD for the next 72 hours. 2) take Flomax for the next 72 hours. 3) drink at least 80 oz fluid (water, juice, Gatorade - NOT tea, coffee, soda pop) daily      Call our office [phone] or go to ER (if after normal office hours) if you develop fever, intractable vomiting, severe/intolerable pain. SURVEY:    You may be receiving a survey from [employer] regarding your visit today. Please complete the survey to enable us to provide the highest quality of care to you and your family. If you cannot score us a very good on any question, please call the office to discuss how we could have made your experience a very good one. Thank you.

## 2022-07-13 NOTE — ED PROVIDER NOTES
[facility] ED  EMERGENCY DEPARTMENT ENCOUNTER      Pt Name: Willa Amos  MRN: [id number]  [place] 1960  Date of evaluation: 7/13/2022  Provider: Jason Steele PA-C    CHIEF COMPLAINT       Chief Complaint   Patient presents with    Other     left ureteral stent removed around 4pm by Dr Devorah Almanzar, c/o spasming in kidney, teking Norco, Ditropan, Flomax, and Toradol without relief, only able to void small amounts         HISTORY OF PRESENT ILLNESS   (Location/Symptom, Timing/Onset, Context/Setting, Quality, Duration, Modifying Factors, Severity)  Note limiting factors. Willa Amos is a 57 y.o. female who presents to the emergency department for evaluation of left flank pain nausea \"spasm\" as patient reports 4 PM in urologist office Dr. Rusty Freedman" she had a ureteral stent removed. Patient reports onset of pain spasm nausea similar to prior episode but more intense. Patient reports she took Toradol and hydrocodone without relief. Patient denies fever difficulty urinating, abdominal pain, chest pain or other complaints. Nursing Notes were reviewed. REVIEW OF SYSTEMS    (2-9 systems for level 4, 10 or more for level 5)     Review of Systems   Constitutional: Negative. Respiratory: Negative. Cardiovascular: Negative. Gastrointestinal: Positive for nausea. Negative for abdominal pain. Genitourinary: Positive for flank pain. Neurological: Negative. Psychiatric/Behavioral: Negative. Except as noted above the remainder of the review of systems was reviewed and negative.        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 ([address]     Thyroid disease          SURGICAL HISTORY       Past Surgical History:   Procedure Laterality Date    APPENDECTOMY      BACK SURGERY      CYSTOSCOPY Right 5/21/2019    CYSTOSCOPY URETEROSCOPY LASER-WITH HLL performed by Sherry Plaza MD at [address]  5/21/2019    CYSTOSCOPY URETERAL STENT INSERTION performed by Sherry Plaza MD at [address] Right 6/28/2022    CYSTOSCOPY URETEROSCOPY LASER-HLL performed by Sherry Plaza MD at [address] Right 6/28/2022    CYSTOSCOPY URETERAL STENT INSERTION- performed by Sherry Plaza MD at [address] Left 7/12/2022    CYSTOSCOPY URETEROSCOPY LASER-HLL performed by Sherry Plaza MD at [address] Left 7/12/2022    CYSTOSCOPY URETERAL STENT INSERTION performed by Sherry Plaza MD at [address] ([address])  1990's    D/T heavy bleeding and clots, still has ovaries.  KNEE SURGERY      bilat x 5    LITHOTRIPSY Right 05/21/2019    Cysto, HLL, stent placement         CURRENT MEDICATIONS       Previous Medications    CEPHALEXIN (KEFLEX) 500 MG CAPSULE    Take 1 capsule by mouth 2 times daily for 7 days    HYDROCODONE-ACETAMINOPHEN (NORCO) 5-325 MG PER TABLET    Take 1 tablet by mouth every 6 hours as needed for Pain for up to 20 days. Intended supply: 5 days. Take lowest dose possible to manage pain    IBUPROFEN (ADVIL) 200 MG CAPS    Take 1 capsule by mouth as needed for Fever    KETOROLAC (TORADOL) 10 MG TABLET    Take 1 tablet by mouth every 8 hours as needed for Pain    LEVOTHYROXINE (SYNTHROID) 100 MCG TABLET    Take 1 tablet by mouth daily    LEVOTHYROXINE (SYNTHROID) 88 MCG TABLET    TAKE ONE TABLET BY MOUTH DAILY    TAMSULOSIN (FLOMAX) 0.4 MG CAPSULE    Take 1 capsule by mouth daily for 10 days       ALLERGIES     Bactrim [sulfamethoxazole-trimethoprim] and Macrobid [nitrofurantoin monohyd macro]    FAMILY HISTORY       Family History   Problem Relation Age of Onset    Diabetes Father     Kidney Disease Father     Cancer Father     Other Other         No family h/o ovarian or breast cancer.      Deep Vein Thrombosis Brother           SOCIAL HISTORY       Social History Socioeconomic History    Marital status:      Spouse name: None    Number of children: None    Years of education: None    Highest education level: None   Occupational History    None   Tobacco Use    Smoking status: Never Smoker    Smokeless tobacco: Never Used   Vaping Use    Vaping Use: Never used   Substance and Sexual Activity    Alcohol use: No    Drug use: Never    Sexual activity: Yes     Partners: Male   Other Topics Concern    None   Social History Narrative    None     Social Determinants of Health     Financial Resource Strain: Low Risk     Difficulty of Paying Living Expenses: Not hard at all   Food Insecurity: No Food Insecurity    Worried About Running Out of Food in the Last Year: Never true    [place]if[place] of Food in the Last Year: Never true   Transportation Needs: No Transportation Needs    Lack of Transportation (Medical): No    Lack of Transportation (Non-Medical): No   Physical Activity: Sufficiently Active    Days of Exercise per Week: 7 days    Minutes of Exercise per Session: 60 min   Stress: No Stress Concern Present    Feeling of Stress : Only a little   Social Connections: Socially Integrated    Frequency of Communication with Friends and Family: More than three times a week    Frequency of Social Gatherings with Friends and Family: More than three times a week    Attends Alevism Services: More than 4 times per year    Active Member of [employer] or Organizations:  Yes    Attends Club or Organization Meetings: More than 4 times per year    Marital Status:    Intimate Partner Violence: Not At Risk    Fear of Current or Ex-Partner: No    Emotionally Abused: No    Physically Abused: No    Sexually Abused: No   Housing Stability: Low Risk     Unable to Pay for Housing in the Last Year: No    Number of [place] in the Last Year: 1    Unstable Housing in the Last Year: No       SCREENINGS         [place] Coma Scale  Eye Opening: Spontaneous  Best Verbal Response: Oriented  Best Motor Response: Obeys commands  Loan Coma Scale Score: 15                     CIWA Assessment  BP: 102/80  Heart Rate: 92                 PHYSICAL EXAM    (up to 7 for level 4, 8 or more for level 5)     ED Triage Vitals   BP Temp Temp src Heart Rate Resp SpO2 Height Weight   07/13/22 1820 07/13/22 1819 -- 07/13/22 1819 07/13/22 1820 07/13/22 1819 -- --   102/80 98.7 °F (37.1 °C)  72 18 99 %         Physical Exam  Vitals and nursing note reviewed. HENT:      Head: Normocephalic and atraumatic. Eyes:      Extraocular Movements: Extraocular movements intact. Cardiovascular:      Rate and Rhythm: Normal rate and regular rhythm. Pulses: Normal pulses. Heart sounds: Normal heart sounds. Pulmonary:      Effort: Pulmonary effort is normal.      Breath sounds: Normal breath sounds. Abdominal:      General: Abdomen is flat. Palpations: Abdomen is soft. Tenderness: There is no right CVA tenderness or left CVA tenderness. Musculoskeletal:         General: Normal range of motion. Skin:     General: Skin is warm and dry. Capillary Refill: Capillary refill takes less than 2 seconds. Neurological:      General: No focal deficit present. Mental Status: She is alert and oriented to person, place, and time. Mental status is at baseline.    Psychiatric:         Mood and Affect: Mood normal.         Behavior: Behavior normal.         DIAGNOSTIC RESULTS     Interpretation per the Radiologist below, if available at the time of this note:    No orders to display         ED BEDSIDE ULTRASOUND:   Performed by ED Physician - none    LABS:  Labs Reviewed   URINALYSIS WITH MICROSCOPIC - Abnormal; Notable for the following components:       Result Value    Color, UA Red (*)     Urine Hgb 3+ (*)     Protein, UA 2+ (*)     Leukocyte Esterase, Urine TRACE (*)     All other components within normal limits       All other labs were within normal range or not returned as of this dictation. EMERGENCY DEPARTMENT COURSE and DIFFERENTIAL DIAGNOSIS/MDM:   Vitals:    Vitals:    07/13/22 1819 07/13/22 1820   BP:  102/80   Pulse: 72 92   Resp:  18   Temp: 98.7 °F (37.1 °C)    SpO2: 99% 97%       MDM  69-year-old nontoxic-appearing female presents to emergency department for evaluation of left flank pain spasm status post left ureteral stent removal.  Patient reports history of similar episodes but notes today is more intense. The plan is to treat patient symptomatically send urine study and reevaluate. Discussed plan with the ER attending, Dr. Curtis Hutchison. REASSESSMENT      Patient had uncomplicated ER course patient reevaluated 1925 hrs. patient reports improvement of discomfort at this time. I discussed with patient need to follow-up with her urologist.  I will send patient home with a few Valium 5 mg tabs with instructions to break them in half take as directed. Turn precautions discussed patient demonstrates no dangerous process. CRITICAL CARE TIME       FINAL IMPRESSION      1. Left flank pain Stable   2. History of removal of ureteral stent Stable         DISPOSITION/PLAN   DISPOSITION        PATIENT REFERRED TO:  Afshin White MD  [address] [place] [address]  [phone]    Schedule an appointment as soon as possible for a visit in 1 day  As needed      DISCHARGE MEDICATIONS:  New Prescriptions    No medications on file     Controlled Substances Monitoring:     No flowsheet data found.     (Please note that portions of this note were completed with a voice recognition program.  Efforts were made to edit the dictations but occasionally words are mis-transcribed.)    Shonna Rodriguez PA-C (electronically signed)  Attending Emergency Physician           Shonna Rodriguez PA-C  07/13/22 1940

## 2022-07-13 NOTE — PROGRESS NOTES
Pt had ureteral stent placed on 07/12/2022 following left HLL. Stent removed via string in office today without difficulty. Pt tolerated removal well.

## 2022-07-14 ENCOUNTER — TELEPHONE (OUTPATIENT)
Dept: UROLOGY | Age: 62
End: 2022-07-14

## 2022-07-14 LAB
CULTURE: NO GROWTH
SPECIMEN DESCRIPTION: NORMAL

## 2022-07-14 NOTE — TELEPHONE ENCOUNTER
Patient was in ER yesterday. She started having pain when she got to the elevator and had tried to urinate. They gave her valium. Then at 12:20am she took one for more pain  and then by 2 it calmed down. She is okay now. I told her to call if she had any more trouble.

## 2022-07-15 ENCOUNTER — TELEPHONE (OUTPATIENT)
Dept: UROLOGY | Age: 62
End: 2022-07-15

## 2022-07-15 LAB
STONE COMPOSITION: NORMAL
STONE DESCRIPTION: NORMAL
STONE MASS: 17 MG

## 2022-07-15 NOTE — TELEPHONE ENCOUNTER
----- Message from NAHOMY Shelton - CNP sent at 7/15/2022 11:16 AM EDT -----  Call pt - urine cx reviewed and negative for UTI

## 2022-07-22 ENCOUNTER — TELEPHONE (OUTPATIENT)
Dept: UROLOGY | Age: 62
End: 2022-07-22

## 2022-07-22 NOTE — TELEPHONE ENCOUNTER
Contacted the patient and advised of the recent phone message. Patient is upset and does not want to go to the ER. She wants to know why she can not go. She is also questioning if she can take an enema. She mentioned she has not had pain medications in over a week. Advised the patient I would inform the providers and the office would then be back in contact with her. She voiced understanding.
Contacted the patient and advised the patient she could perform enema. If no results were produced she would still need to go to the ER. Patient mentioned she had tried the enema already with no results. Urged patient to report to the ER. She voiced understanding.
Earlier in the week (7/20 through [employer]) we instructed her to use miralax twice per day and colace three times per day. ..     She needs to go to the ER
Patient contacted the office c/o constipation, heart burn, and nausea. States she just feels miserable. Has not had BM, and has been taking Miralax daily, Colace BID, and is consuming 80 oz of fluids per day. Advised the patient the providers would be informed and the office would then be back in contact with her. She voiced understanding.
sharp/squeezing

## 2022-08-05 ENCOUNTER — HOSPITAL ENCOUNTER (OUTPATIENT)
Age: 62
Discharge: HOME | End: 2022-08-07
Payer: COMMERCIAL

## 2022-08-05 ENCOUNTER — HOSPITAL ENCOUNTER (OUTPATIENT)
Dept: GENERAL RADIOLOGY | Age: 62
Discharge: HOME | End: 2022-08-07
Payer: COMMERCIAL

## 2022-08-05 DIAGNOSIS — N20.1 URETERAL STONE: ICD-10-CM

## 2022-08-05 PROCEDURE — 74018 RADEX ABDOMEN 1 VIEW: CPT

## 2022-08-08 ENCOUNTER — HOSPITAL ENCOUNTER (OUTPATIENT)
Dept: WOMENS IMAGING | Age: 62
Discharge: HOME | End: 2022-08-10
Payer: COMMERCIAL

## 2022-08-08 ENCOUNTER — HOSPITAL ENCOUNTER (OUTPATIENT)
Dept: CT IMAGING | Age: 62
Discharge: HOME | End: 2022-08-10
Payer: COMMERCIAL

## 2022-08-08 ENCOUNTER — OFFICE VISIT (OUTPATIENT)
Dept: UROLOGY | Age: 62
End: 2022-08-08
Payer: COMMERCIAL

## 2022-08-08 ENCOUNTER — HOSPITAL ENCOUNTER (OUTPATIENT)
Age: 62
Discharge: HOME | End: 2022-08-08
Payer: COMMERCIAL

## 2022-08-08 VITALS
SYSTOLIC BLOOD PRESSURE: 131 MMHG | HEIGHT: 67 IN | HEART RATE: 71 BPM | WEIGHT: 183 LBS | TEMPERATURE: 98.5 F | DIASTOLIC BLOOD PRESSURE: 85 MMHG | BODY MASS INDEX: 28.72 KG/M2

## 2022-08-08 DIAGNOSIS — K59.00 CONSTIPATION, UNSPECIFIED CONSTIPATION TYPE: ICD-10-CM

## 2022-08-08 DIAGNOSIS — Z12.31 SCREENING MAMMOGRAM, ENCOUNTER FOR: ICD-10-CM

## 2022-08-08 DIAGNOSIS — R10.32 LLQ PAIN: ICD-10-CM

## 2022-08-08 DIAGNOSIS — R10.32 LLQ PAIN: Primary | ICD-10-CM

## 2022-08-08 DIAGNOSIS — N20.0 KIDNEY STONES: ICD-10-CM

## 2022-08-08 PROCEDURE — 87086 URINE CULTURE/COLONY COUNT: CPT

## 2022-08-08 PROCEDURE — 74176 CT ABD & PELVIS W/O CONTRAST: CPT

## 2022-08-08 PROCEDURE — 99215 OFFICE O/P EST HI 40 MIN: CPT | Performed by: NURSE PRACTITIONER

## 2022-08-08 PROCEDURE — 77067 SCR MAMMO BI INCL CAD: CPT

## 2022-08-08 ASSESSMENT — ENCOUNTER SYMPTOMS
VOMITING: 0
COLOR CHANGE: 0
BACK PAIN: 0
ABDOMINAL PAIN: 1
EYE REDNESS: 0
SHORTNESS OF BREATH: 0
WHEEZING: 0
APNEA: 0
CONSTIPATION: 1
NAUSEA: 0
COUGH: 0

## 2022-08-08 NOTE — PROGRESS NOTES
ct    HPI:        Patient is a 57 y.o. female in no acute distress. She is alert and oriented to person, place, and time. History  Referral from Dr. Jade Ward for flank pain. Patient has had intermittent flank pain for several years. She does have a history of stones, has never had surgery. She spends a significant amount of time in [place] and has seen a urologist in [place] in the past.  She denies gross hematuria, dysuria, frequency, nocturia, urgency, or incontinence. She does report significant constipation, and has noticed worsening flank and abdominal pain with worsening bowel issues. She will go several days without a bowel movement. She denies history of frequent urinary tract infections. She did have a CT scan performed in 2013 that did show nonobstructing bilateral renal stones. 11/2018 CT showed bilateral punctate, nonobstructing stones. Recent urine culture was negative for infection, but did show 2-5 rbc/hpf. She is taking MiraLAX daily for her bowels. She denies any flank or abdominal pain, dysuria or gross hematuria. Previous urology records summarized below:  12/2013 CT urogram showed 2 x 4 distal ureteral stone, no filling defects to suggest malignancy. Cystoscopy was negative. She did do a 24-hour urine collection on 2/2014 that showed extreme hypercalciuria, borderline low urine pH, and mild hyperuricosuira. She was started on hydrochlorothiazide 12.5 mg daily. 5/2019 Right HLL    6/2022 right HLL    7/2022 left HLL    Today  Here today following right and left holmium laser lithotripsy. She continues to complain of left flank and left abdominal pain. She denies dysuria or gross hematuria. She continues to complain of constipation despite MiraLAX. She is only taking MiraLAX once a day due to diarrhea. We instructed her to take this twice per day. We did instruct her to take Colace three times per day, but she states \"this is a waste. \"  On 7/22/2022 we suggested that she go to the ER for an enema or to rule out bowel obstruction, but she refused. She did have a KUB complete prior to today's visit. This film was independently reviewed and shows do evidence of  calcification. There is a large about ob stool throughout. She is eating bananas three days per week and apple sauce. She claims she is drinking adequate amount of water daily. 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 ([address]     Thyroid disease      Past Surgical History:   Procedure Laterality Date    APPENDECTOMY      BACK SURGERY      CYSTOSCOPY Right 5/21/2019    CYSTOSCOPY URETEROSCOPY LASER-WITH HLL performed by Darian Rodriguez MD at [address]  5/21/2019    CYSTOSCOPY URETERAL STENT INSERTION performed by Darian Rodriguez MD at [address] Right 6/28/2022    CYSTOSCOPY URETEROSCOPY LASER-HLL performed by Darian Rodriguez MD at [address] Right 6/28/2022    CYSTOSCOPY URETERAL STENT INSERTION- performed by Darian Rodriguez MD at [address] Left 7/12/2022    CYSTOSCOPY URETEROSCOPY LASER-HLL performed by Darian Rodriguez MD at [address] Left 7/12/2022    CYSTOSCOPY URETERAL STENT INSERTION performed by Darian Rodriguez MD at [address] ([facility])  1990's    D/T heavy bleeding and clots, still has ovaries.      KNEE SURGERY      bilat x 5    LITHOTRIPSY Right 05/21/2019    Cysto, HLL, stent placement     Outpatient Encounter Medications as of 8/8/2022   Medication Sig Dispense Refill    levothyroxine (SYNTHROID) 88 MCG tablet TAKE ONE TABLET BY MOUTH DAILY 180 tablet 3    ibuprofen (ADVIL;MOTRIN) 200 MG CAPS Take 1 capsule by mouth as needed for Fever      tamsulosin (FLOMAX) 0.4 MG capsule Take 1 capsule by mouth daily for 10 days 10 capsule 0    ketorolac (TORADOL) 10 MG tablet Take 1 tablet by mouth every 8 hours as needed for Pain 15 tablet 0    [DISCONTINUED] levothyroxine (SYNTHROID) 100 MCG tablet Take 1 tablet by mouth daily (Patient not taking: Reported on 8/8/2022) 30 tablet 5     No facility-administered encounter medications on file as of 8/8/2022. Current Outpatient Medications on File Prior to Visit   Medication Sig Dispense Refill    levothyroxine (SYNTHROID) 88 MCG tablet TAKE ONE TABLET BY MOUTH DAILY 180 tablet 3    ibuprofen (ADVIL;MOTRIN) 200 MG CAPS Take 1 capsule by mouth as needed for Fever      tamsulosin (FLOMAX) 0.4 MG capsule Take 1 capsule by mouth daily for 10 days 10 capsule 0    ketorolac (TORADOL) 10 MG tablet Take 1 tablet by mouth every 8 hours as needed for Pain 15 tablet 0     No current facility-administered medications on file prior to visit. Bactrim [sulfamethoxazole-trimethoprim] and Macrobid [nitrofurantoin monohyd macro]  Family History   Problem Relation Age of Onset    Diabetes Father     Kidney Disease Father     Cancer Father     Other Other         No family h/o ovarian or breast cancer. Deep Vein Thrombosis Brother      Social History     Tobacco Use   Smoking Status Never   Smokeless Tobacco Never       Social History     Substance and Sexual Activity   Alcohol Use No       Review of Systems   Constitutional:  Negative for appetite change, chills and fever. Eyes:  Negative for redness and visual disturbance. Respiratory:  Negative for apnea, cough, shortness of breath and wheezing. Cardiovascular:  Negative for chest pain and leg swelling. Gastrointestinal:  Positive for abdominal pain and constipation. Negative for nausea and vomiting. Genitourinary:  Positive for flank pain. Negative for difficulty urinating, dyspareunia, dysuria, enuresis, frequency, hematuria, pelvic pain, urgency, vaginal bleeding and vaginal discharge. Musculoskeletal:  Negative for back pain, joint swelling and myalgias. Skin:  Negative for color change, rash and wound. Neurological:  Negative for dizziness, tremors and numbness. Hematological:  Negative for adenopathy. Does not bruise/bleed easily. Psychiatric/Behavioral:  Negative for sleep disturbance. /85 (Site: Left Upper Arm, Position: Sitting, Cuff Size: Medium Adult)   Pulse 71   Temp 98.5 °F (36.9 °C)   Ht 5' 7\" (1.702 m)   Wt 183 lb (83 kg)   LMP  (LMP Unknown)   BMI 28.66 kg/m²       PHYSICAL EXAM:  Constitutional: Patient resting comfortably, in no acute distress. Neuro: Alert and oriented to person place and time. Cranial nerves grossly intact. Psych: Mood and affect normal.  Skin: Warm, dry  HEENT: normocephalic, atraumatic  Lungs: Respiratory effort normal, unlabored        Lab Results   Component Value Date    BUN 25 (H) 07/06/2022     Lab Results   Component Value Date    CREATININE 0.83 07/06/2022       ASSESSMENT:   Diagnosis Orders   1. LLQ pain  CT ABDOMEN PELVIS WO CONTRAST Additional Contrast? None    Culture, Urine      2. Constipation, unspecified constipation type  CT ABDOMEN PELVIS WO CONTRAST Additional Contrast? None    Culture, Urine      3. Kidney stones                PLAN:  I urged her to ensure she is drinking 64-80 ounces of water every day    I urged her to avoid bananas and applesauce because these can be constipating. I encouraged apples with the skin on it, green leafy vegetables     I offered a CT scan for continued left pain and LLQ pain, but patient declined    I offered a GI referral for persistent constipation, but patient declined. I attempted to explain that she has a history of constipation. She had 2 surgeries and she recently had her medication adjusted for her thyroid. She is adamant that something happened with surgery or with anesthesia to cause these issues. I again offered a GI referral and she declined.  She also complained about the advice we gave her for miralax and colace or go to the ER if this didn't for an enema and/or rule out bowel obstruction. I again referred her to GI. She complained about lack of privacy in the ER and states she will never go to the ER. I offered to bring in Dr. Cesilia Fuller to speak with her, she declined     I offered a referral to a different urology group because patient is not happy with our office or her care, she declined    I offered a 24 hour urine, she initially declined but then did agree to complete    She was very upset during her office visit. I asked her what she would like me to do? Just listen to her complaints? Again, I offered refferals and more testing etc...      She did finally agree to a CT scan due to pain    F/U based on results

## 2022-08-09 ENCOUNTER — TELEPHONE (OUTPATIENT)
Dept: UROLOGY | Age: 62
End: 2022-08-09

## 2022-08-09 LAB
CULTURE: NORMAL
SPECIMEN DESCRIPTION: NORMAL

## 2022-08-09 NOTE — TELEPHONE ENCOUNTER
----- Message from NAHOMY Macias CNP sent at 8/9/2022  7:59 AM EDT -----  CT is negative for any abnormalities

## 2022-08-10 ENCOUNTER — TELEPHONE (OUTPATIENT)
Dept: UROLOGY | Age: 62
End: 2022-08-10

## 2022-08-10 NOTE — TELEPHONE ENCOUNTER
----- Message from NAHOMY Rawls - CNP sent at 8/10/2022  9:25 AM EDT -----  Call pt - urine cx reviewed and negative for UTI

## 2022-08-21 ENCOUNTER — HOSPITAL ENCOUNTER (EMERGENCY)
Age: 62
Discharge: HOME | End: 2022-08-21
Attending: EMERGENCY MEDICINE
Payer: COMMERCIAL

## 2022-08-21 VITALS
TEMPERATURE: 98.4 F | RESPIRATION RATE: 20 BRPM | HEART RATE: 70 BPM | SYSTOLIC BLOOD PRESSURE: 161 MMHG | DIASTOLIC BLOOD PRESSURE: 78 MMHG | OXYGEN SATURATION: 97 %

## 2022-08-21 DIAGNOSIS — S39.012A LUMBOSACRAL STRAIN, INITIAL ENCOUNTER: Primary | ICD-10-CM

## 2022-08-21 PROCEDURE — 99283 EMERGENCY DEPT VISIT LOW MDM: CPT

## 2022-08-21 RX ORDER — CYCLOBENZAPRINE HCL 10 MG
10 TABLET ORAL 3 TIMES DAILY PRN
Qty: 21 TABLET | Refills: 0 | Status: SHIPPED
Start: 2022-08-21 | End: 2022-08-29 | Stop reason: CLARIF

## 2022-08-21 RX ORDER — PREDNISONE 50 MG/1
50 TABLET ORAL DAILY
Qty: 7 TABLET | Refills: 0 | Status: SHIPPED | OUTPATIENT
Start: 2022-08-21 | End: 2022-08-28

## 2022-08-21 RX ORDER — OXYCODONE HYDROCHLORIDE 5 MG/1
5 TABLET ORAL EVERY 8 HOURS PRN
Qty: 8 TABLET | Refills: 0 | Status: SHIPPED | OUTPATIENT
Start: 2022-08-21 | End: 2022-08-24

## 2022-08-21 ASSESSMENT — PAIN - FUNCTIONAL ASSESSMENT: PAIN_FUNCTIONAL_ASSESSMENT: 0-10

## 2022-08-21 ASSESSMENT — ENCOUNTER SYMPTOMS
SHORTNESS OF BREATH: 0
ABDOMINAL DISTENTION: 0
SORE THROAT: 0
BACK PAIN: 1

## 2022-08-21 ASSESSMENT — PAIN SCALES - GENERAL: PAINLEVEL_OUTOF10: 10

## 2022-08-21 NOTE — ED PROVIDER NOTES
[facility] ED  EMERGENCY DEPARTMENT ENCOUNTER      Pt Name: Lesly Dallas  MRN: [id number]  K[place]gfomayra 1960  Date of evaluation: 8/21/2022  Provider: Wen Rowan DO    CHIEF COMPLAINT       Chief Complaint   Patient presents with    Back Pain     Pulled something putting on shorts on Friday had left over Oxycodone and took one today         HISTORY OF PRESENT ILLNESS   (Location/Symptom, Timing/Onset, Context/Setting, Quality, Duration, Modifying Factors, Severity)  Note limiting factors. Lesly Dallas is a 60 y.o. female who presents to the emergency department with complains of low back pain starting Friday. Patient said that she has had back pain for on and off for the last 1 week but on Friday she was pulling up her shorts when she pulled something in her lower back. Patient states that she had some kind of a kidney surgery done a month ago and had some leftover oxycodone so she took some today. It has helped a little bit but she has to keep taking it. She denies any numbness or tingling down her lower extremities. She has tried heating pad as well which helps sometimes. She denies any bowel or bladder incontinence. She has had a spinal fusion done in the 1990s for a ruptured disc. She denies any weakness of her lower extremities. She has no other concerns at this time. REVIEW OF SYSTEMS    (2-9 systems for level 4, 10 or more for level 5)     Review of Systems   Constitutional:  Negative for fatigue and fever. HENT:  Negative for congestion and sore throat. Eyes:  Negative for visual disturbance. Respiratory:  Negative for shortness of breath. Cardiovascular:  Negative for chest pain and palpitations. Gastrointestinal:  Negative for abdominal distention. Genitourinary:  Negative for difficulty urinating, dysuria and flank pain. Musculoskeletal:  Positive for back pain. Skin:  Negative for rash. Neurological:  Negative for tremors, weakness and numbness. Psychiatric/Behavioral:  Negative for suicidal ideas. Except as noted above the remainder of the review of systems was reviewed and negative. PAST MEDICAL HISTORY     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 ([address]     Thyroid disease          SURGICAL HISTORY       Past Surgical History:   Procedure Laterality Date    APPENDECTOMY      BACK SURGERY      CYSTOSCOPY Right 5/21/2019    CYSTOSCOPY URETEROSCOPY LASER-WITH HLL performed by Callum Fontaine MD at [address]  5/21/2019    CYSTOSCOPY URETERAL STENT INSERTION performed by Callum Fontaine MD at [address] Right 6/28/2022    CYSTOSCOPY URETEROSCOPY LASER-HLL performed by Callum Fontaine MD at [address] Right 6/28/2022    CYSTOSCOPY URETERAL STENT INSERTION- performed by Callum Fontaine MD at [address] Left 7/12/2022    CYSTOSCOPY URETEROSCOPY LASER-HLL performed by Callum Fontaine MD at [address] Left 7/12/2022    CYSTOSCOPY URETERAL STENT INSERTION performed by Callum Fontaine MD at [facility] ([address] [place] [address])  1990's    D/T heavy bleeding and clots, still has ovaries.      KNEE SURGERY      bilat x 5    LITHOTRIPSY Right 05/21/2019    Cysto, HLL, stent placement         CURRENT MEDICATIONS       Previous Medications    KETOROLAC (TORADOL) 10 MG TABLET    Take 1 tablet by mouth every 8 hours as needed for Pain    LEVOTHYROXINE (SYNTHROID) 88 MCG TABLET    TAKE ONE TABLET BY MOUTH DAILY    TAMSULOSIN (FLOMAX) 0.4 MG CAPSULE    Take 1 capsule by mouth daily for 10 days       ALLERGIES     Bactrim [sulfamethoxazole-trimethoprim] and Macrobid [nitrofurantoin monohyd macro]    FAMILY HISTORY       Family History   Problem Relation Age of Onset    Diabetes Father     Kidney Disease Father     Cancer Father     Other Other         No family h/o ovarian or breast cancer. Deep Vein Thrombosis Brother           SOCIAL HISTORY       Social History     Socioeconomic History    Marital status:      Spouse name: None    Number of children: None    Years of education: None    Highest education level: None   Tobacco Use    Smoking status: Never    Smokeless tobacco: Never   Vaping Use    Vaping Use: Never used   Substance and Sexual Activity    Alcohol use: No    Drug use: Never    Sexual activity: Yes     Partners: Male     Social Determinants of Health     Financial Resource Strain: Low Risk     Difficulty of Paying Living Expenses: Not hard at all   Food Insecurity: No Food Insecurity    Worried About Running Out of Food in the Last Year: Never true    Ran Out of Food in the Last Year: Never true   Transportation Needs: No Transportation Needs    Lack of Transportation (Medical): No    Lack of Transportation (Non-Medical): No   Physical Activity: Sufficiently Active    Days of Exercise per Week: 7 days    Minutes of Exercise per Session: 60 min   Stress: No Stress Concern Present    Feeling of Stress :  Only a little   Social Connections: Socially Integrated    Frequency of Communication with Friends and Family: More than three times a week    Frequency of Social Gatherings with Friends and Family: More than three times a week    Attends Anab[employer]ism Services: More than 4 times per year    Active Member of [employer] or Organizations: Yes    Attends Club or Organization Meetings: More than 4 times per year    Marital Status:    Intimate Partner Violence: Not At Risk    Fear of Current or Ex-Partner: No    Emotionally Abused: No    Physically Abused: No    Sexually Abused: No   Housing Stability: Low Risk     Unable to Pay for Housing in the Last Year: No    Number of [place] in the Last Year: 1    Unstable Housing in the Last Year: No       SCREENINGS        PHYSICAL EXAM    (up to 7 for level 4, 8 or more for level 5)     ED Triage Vitals [08/21/22 1414]   BP Temp Temp Source Heart Rate Resp SpO2 Height Weight   (!) 161/78 98.4 °F (36.9 °C) Tympanic 70 20 97 % -- --       Physical Exam  Constitutional:       General: She is not in acute distress. Appearance: Normal appearance. She is not toxic-appearing. HENT:      Head: Normocephalic and atraumatic. Mouth/Throat:      Mouth: Mucous membranes are moist.   Eyes:      Extraocular Movements: Extraocular movements intact. Pupils: Pupils are equal, round, and reactive to light. Cardiovascular:      Rate and Rhythm: Normal rate and regular rhythm. Pulses: Normal pulses. Heart sounds: Normal heart sounds. Pulmonary:      Effort: Pulmonary effort is normal.      Breath sounds: Normal breath sounds. Abdominal:      General: Abdomen is flat. Bowel sounds are normal.      Palpations: Abdomen is soft. Musculoskeletal:         General: Normal range of motion. Cervical back: Normal.      Thoracic back: Normal.      Lumbar back: Normal.      Comments: Patient has good range of motion of both her lower extremities. Negative straight leg raise test bilaterally. Normal deep tendon reflexes of bilateral lower extremities. Patient's pain is worse with any kind of movement of her back. Skin:     General: Skin is warm and dry. Capillary Refill: Capillary refill takes less than 2 seconds. Neurological:      General: No focal deficit present. Mental Status: She is alert and oriented to person, place, and time. Psychiatric:         Mood and Affect: Mood normal.       DIAGNOSTIC RESULTS       EMERGENCY DEPARTMENT COURSE and DIFFERENTIAL DIAGNOSIS/MDM:   Vitals:    Vitals:    08/21/22 1414   BP: (!) 161/78   Pulse: 70   Resp: 20   Temp: 98.4 °F (36.9 °C)   TempSrc: Tympanic   SpO2: 97%       REASSESSMENT      Discussed with patient that she likely has a muscle strain and she pulled a muscle. I will start her on a course of prednisone and Flexeril.   Patient states that she only has 2 more oxycodones left and would like a few more until she can get to see her doctor. Recommended physical therapy as well. If symptoms or not improving after physical therapy recommend outpatient MRI. Patient understands and has no other questions or concerns. FINAL IMPRESSION      1. Lumbosacral strain, initial encounter          DISPOSITION/PLAN   DISPOSITION Decision To Discharge 08/21/2022 02:59:04 PM      PATIENT REFERRED TO:  Camille Tee, APRN - [facility]  [address] [place] [address] [phone]    In 1 week      DISCHARGE MEDICATIONS:  New Prescriptions    CYCLOBENZAPRINE (FLEXERIL) 10 MG TABLET    Take 1 tablet by mouth 3 times daily as needed for Muscle spasms    OXYCODONE (ROXICODONE) 5 MG IMMEDIATE RELEASE TABLET    Take 1 tablet by mouth every 8 hours as needed for Pain for up to 3 days. Intended supply: 3 days.  Take lowest dose possible to manage pain    PREDNISONE (DELTASONE) 50 MG TABLET    Take 1 tablet by mouth daily for 7 days         (Please note that portions of this note were completed with a voice recognition program.  Efforts were made to edit the dictations but occasionally words are mis-transcribed.)    Irineo Hamilton DO (electronically signed)  Attending Emergency Physician            Irineo Hamilton DO  08/21/22 [facility]  08/21/22 4310

## 2022-08-29 ENCOUNTER — HOSPITAL ENCOUNTER (OUTPATIENT)
Dept: GENERAL RADIOLOGY | Age: 62
Discharge: HOME | End: 2022-08-31
Payer: COMMERCIAL

## 2022-08-29 ENCOUNTER — HOSPITAL ENCOUNTER (OUTPATIENT)
Age: 62
Discharge: HOME | End: 2022-08-31
Payer: COMMERCIAL

## 2022-08-29 DIAGNOSIS — M54.50 BACK PAIN, LUMBOSACRAL: ICD-10-CM

## 2022-08-29 PROCEDURE — 72110 X-RAY EXAM L-2 SPINE 4/>VWS: CPT

## 2023-05-24 ENCOUNTER — HOSPITAL ENCOUNTER (OUTPATIENT)
Age: 63
Discharge: HOME | End: 2023-05-26
Payer: COMMERCIAL

## 2023-05-24 ENCOUNTER — HOSPITAL ENCOUNTER (OUTPATIENT)
Dept: GENERAL RADIOLOGY | Age: 63
Discharge: HOME | End: 2023-05-26
Payer: COMMERCIAL

## 2023-05-24 DIAGNOSIS — M79.672 LEFT FOOT PAIN: ICD-10-CM

## 2023-05-24 PROCEDURE — 73630 X-RAY EXAM OF FOOT: CPT

## 2023-07-06 ENCOUNTER — OFFICE VISIT (OUTPATIENT)
Dept: OBGYN | Age: 63
End: 2023-07-06

## 2023-07-06 VITALS
DIASTOLIC BLOOD PRESSURE: 76 MMHG | SYSTOLIC BLOOD PRESSURE: 126 MMHG | BODY MASS INDEX: 28.49 KG/M2 | HEIGHT: 68 IN | WEIGHT: 188 LBS

## 2023-07-06 DIAGNOSIS — Z01.419 ENCOUNTER FOR ANNUAL ROUTINE GYNECOLOGICAL EXAMINATION: Primary | ICD-10-CM

## 2023-07-06 DIAGNOSIS — Z12.31 SCREENING MAMMOGRAM, ENCOUNTER FOR: ICD-10-CM

## 2023-07-06 DIAGNOSIS — R35.0 URINARY FREQUENCY: ICD-10-CM

## 2023-07-06 RX ORDER — FLUCONAZOLE 150 MG/1
150 TABLET ORAL ONCE
Qty: 1 TABLET | Refills: 0 | Status: SHIPPED | OUTPATIENT
Start: 2023-07-06 | End: 2023-07-06

## 2023-07-06 RX ORDER — CEPHALEXIN 500 MG/1
500 CAPSULE ORAL 3 TIMES DAILY
Qty: 21 CAPSULE | Refills: 0 | Status: SHIPPED | OUTPATIENT
Start: 2023-07-06 | End: 2023-07-13

## 2023-07-09 ASSESSMENT — ENCOUNTER SYMPTOMS
CHEST TIGHTNESS: 0
ABDOMINAL PAIN: 0
BACK PAIN: 0

## 2023-07-17 ENCOUNTER — HOSPITAL ENCOUNTER (OUTPATIENT)
Age: 63
Setting detail: SPECIMEN
Discharge: HOME | End: 2023-07-17
Payer: COMMERCIAL

## 2023-07-17 ENCOUNTER — HOSPITAL ENCOUNTER (OUTPATIENT)
Dept: CT IMAGING | Age: 63
Discharge: HOME | End: 2023-07-19
Payer: COMMERCIAL

## 2023-07-17 ENCOUNTER — OFFICE VISIT (OUTPATIENT)
Dept: UROLOGY | Age: 63
End: 2023-07-17
Payer: COMMERCIAL

## 2023-07-17 ENCOUNTER — TELEPHONE (OUTPATIENT)
Dept: UROLOGY | Age: 63
End: 2023-07-17

## 2023-07-17 VITALS
DIASTOLIC BLOOD PRESSURE: 78 MMHG | BODY MASS INDEX: 28.79 KG/M2 | TEMPERATURE: 97.3 F | WEIGHT: 190 LBS | HEIGHT: 68 IN | SYSTOLIC BLOOD PRESSURE: 136 MMHG

## 2023-07-17 DIAGNOSIS — N23 RENAL COLIC: ICD-10-CM

## 2023-07-17 DIAGNOSIS — N23 RENAL COLIC: Primary | ICD-10-CM

## 2023-07-17 DIAGNOSIS — N20.1 URETERAL STONE: ICD-10-CM

## 2023-07-17 LAB
BACTERIA URNS QL MICRO: ABNORMAL
BILIRUB UR QL STRIP: NEGATIVE
CLARITY UR: CLEAR
COLOR UR: YELLOW
EPI CELLS #/AREA URNS HPF: ABNORMAL /HPF (ref 0–25)
GLUCOSE UR STRIP-MCNC: NEGATIVE MG/DL
HGB UR QL STRIP.AUTO: NEGATIVE
KETONES UR STRIP-MCNC: NEGATIVE MG/DL
LEUKOCYTE ESTERASE UR QL STRIP: ABNORMAL
NITRITE UR QL STRIP: NEGATIVE
PH UR STRIP: 6 [PH] (ref 5–9)
PROT UR STRIP-MCNC: NEGATIVE MG/DL
RBC #/AREA URNS HPF: ABNORMAL /HPF (ref 0–2)
SP GR UR STRIP: 1.01 (ref 1.01–1.02)
UROBILINOGEN UR STRIP-ACNC: NORMAL EU/DL (ref 0–1)
WBC #/AREA URNS HPF: ABNORMAL /HPF (ref 0–5)

## 2023-07-17 PROCEDURE — 74176 CT ABD & PELVIS W/O CONTRAST: CPT

## 2023-07-17 PROCEDURE — 87086 URINE CULTURE/COLONY COUNT: CPT

## 2023-07-17 PROCEDURE — 81001 URINALYSIS AUTO W/SCOPE: CPT

## 2023-07-17 PROCEDURE — 99214 OFFICE O/P EST MOD 30 MIN: CPT | Performed by: UROLOGY

## 2023-07-17 RX ORDER — ONDANSETRON 4 MG/1
4 TABLET, ORALLY DISINTEGRATING ORAL 3 TIMES DAILY PRN
Qty: 21 TABLET | Refills: 0 | Status: SHIPPED | OUTPATIENT
Start: 2023-07-17

## 2023-07-17 RX ORDER — TAMSULOSIN HYDROCHLORIDE 0.4 MG/1
0.4 CAPSULE ORAL DAILY
Qty: 30 CAPSULE | Refills: 0 | Status: SHIPPED | OUTPATIENT
Start: 2023-07-17

## 2023-07-17 RX ORDER — KETOROLAC TROMETHAMINE 10 MG/1
10 TABLET, FILM COATED ORAL EVERY 6 HOURS PRN
Qty: 20 TABLET | Refills: 0 | Status: SHIPPED | OUTPATIENT
Start: 2023-07-17 | End: 2024-07-16

## 2023-07-17 NOTE — TELEPHONE ENCOUNTER
Complete a CT, then come to the office     [Dr. Carolyn Flores only] Adequate: hears normal conversation without difficulty

## 2023-07-17 NOTE — PATIENT INSTRUCTIONS
SURVEY:    You may be receiving a survey from [employer] regarding your visit today. Please complete the survey to enable us to provide the highest quality of care to you and your family. If you cannot score us a very good on any question, please call the office to discuss how we could of made your experience a very good one. Thank you.

## 2023-07-17 NOTE — PROGRESS NOTES
HPI:        Patient is a 60 y.o. female in no acute distress. She is alert and oriented to person, place, and time. History  Referral from Dr. Harvinder White for flank pain. Patient has had intermittent flank pain for several years. She does have a history of stones, has never had surgery. She spends a significant amount of time in [place] and has seen a urologist in [place] in the past.  She denies gross hematuria, dysuria, frequency, nocturia, urgency, or incontinence. She does report significant constipation, and has noticed worsening flank and abdominal pain with worsening bowel issues. She will go several days without a bowel movement. She denies history of frequent urinary tract infections. She did have a CT scan performed in 2013 that did show nonobstructing bilateral renal stones. 11/2018 CT showed bilateral punctate, nonobstructing stones. Recent urine culture was negative for infection, but did show 2-5 rbc/hpf. She is taking MiraLAX daily for her bowels. She denies any flank or abdominal pain, dysuria or gross hematuria. Previous urology records summarized below:  12/2013 CT urogram showed 2 x 4 distal ureteral stone, no filling defects to suggest malignancy. Cystoscopy was negative. She did do a 24-hour urine collection on 2/2014 that showed extreme hypercalciuria, borderline low urine pH, and mild hyperuricosuira. She was started on hydrochlorothiazide 12.5 mg daily. 5/2019 Right HLL    6/2022 right HLL    7/2022 left HLL    Currently  Patient is here today was lower urinary tract symptoms. She is having some mild left flank pain as well. Her main issues with feelings of fullness and urgency and frequency. She has no fevers. She has no nausea vomiting. She reports no gross hematuria or dysuria. Patient's pain is 3 out of 10 today. Patient did have a CT scan performed. This film was independently reviewed today. This does show calculi in the distal left ureter.

## 2023-07-17 NOTE — TELEPHONE ENCOUNTER
Patient called to report that she is having pelvic pain that she believes she has another stone. Says the pain started yesterday, rates her pain \"3-4\". Says she thought she had a UTI a week or so ago and had her PCP do a urine dip. That was negative but was given Keflex since she was going out of town. She says she took a dose yesterday due to the discomfort.

## 2023-07-17 NOTE — TELEPHONE ENCOUNTER
Centralized scheduling notified of stat CT. Patient notified of stat CT and to come to the office following.

## 2023-07-18 LAB
MICROORGANISM SPEC CULT: NO GROWTH
SPECIMEN DESCRIPTION: NORMAL

## 2023-07-19 ENCOUNTER — TELEPHONE (OUTPATIENT)
Dept: UROLOGY | Age: 63
End: 2023-07-19

## 2023-07-19 NOTE — TELEPHONE ENCOUNTER
----- Message from NAHOMY Millan - CNP sent at 7/19/2023  9:06 AM EDT -----  Call pt - urine cx reviewed and negative for UTI & for significant microhematuria

## 2023-08-11 ENCOUNTER — TELEPHONE (OUTPATIENT)
Dept: UROLOGY | Age: 63
End: 2023-08-11

## 2023-08-11 DIAGNOSIS — N23 RENAL COLIC: ICD-10-CM

## 2023-08-11 DIAGNOSIS — R30.0 DYSURIA: Primary | ICD-10-CM

## 2023-08-11 DIAGNOSIS — N20.1 URETERAL STONE: ICD-10-CM

## 2023-08-11 RX ORDER — TAMSULOSIN HYDROCHLORIDE 0.4 MG/1
0.4 CAPSULE ORAL DAILY
Qty: 30 CAPSULE | Refills: 0 | Status: SHIPPED | OUTPATIENT
Start: 2023-08-11

## 2023-08-11 NOTE — TELEPHONE ENCOUNTER
Ensure you are getting a minimum of 64-80 ounces of water every day    Continue flomax    Drop off urine culture    May take OTC AZO as directed as needed

## 2023-08-17 ENCOUNTER — TELEPHONE (OUTPATIENT)
Dept: UROLOGY | Age: 63
End: 2023-08-17

## 2023-08-17 NOTE — TELEPHONE ENCOUNTER
I would save the one on a paper towel for analysis. Could possibly be stones. We should see her when she is home.  If she has fever, vomiting or severe pain I would recommend ER evaluation

## 2023-08-21 ENCOUNTER — OFFICE VISIT (OUTPATIENT)
Dept: UROLOGY | Age: 63
End: 2023-08-21
Payer: COMMERCIAL

## 2023-08-21 ENCOUNTER — HOSPITAL ENCOUNTER (OUTPATIENT)
Age: 63
Setting detail: SPECIMEN
Discharge: HOME | End: 2023-08-21
Payer: COMMERCIAL

## 2023-08-21 VITALS
DIASTOLIC BLOOD PRESSURE: 76 MMHG | TEMPERATURE: 98.4 F | HEART RATE: 77 BPM | BODY MASS INDEX: 29.98 KG/M2 | WEIGHT: 191 LBS | HEIGHT: 67 IN | SYSTOLIC BLOOD PRESSURE: 111 MMHG

## 2023-08-21 DIAGNOSIS — N23 RENAL COLIC: ICD-10-CM

## 2023-08-21 DIAGNOSIS — N20.0 RENAL CALCULUS: ICD-10-CM

## 2023-08-21 DIAGNOSIS — N20.1 URETERAL CALCULUS: ICD-10-CM

## 2023-08-21 DIAGNOSIS — N20.0 RENAL CALCULUS: Primary | ICD-10-CM

## 2023-08-21 PROCEDURE — 82365 CALCULUS SPECTROSCOPY: CPT

## 2023-08-21 PROCEDURE — 99214 OFFICE O/P EST MOD 30 MIN: CPT | Performed by: UROLOGY

## 2023-08-21 RX ORDER — PHENAZOPYRIDINE HYDROCHLORIDE 100 MG/1
100 TABLET, FILM COATED ORAL 3 TIMES DAILY PRN
Qty: 30 TABLET | Refills: 0 | Status: SHIPPED | OUTPATIENT
Start: 2023-08-21 | End: 2023-08-31

## 2023-08-21 ASSESSMENT — ENCOUNTER SYMPTOMS
EYE REDNESS: 0
CONSTIPATION: 0
VOMITING: 0
COUGH: 0
ABDOMINAL PAIN: 0
BACK PAIN: 0
APNEA: 0
SHORTNESS OF BREATH: 0
NAUSEA: 0
WHEEZING: 0
COLOR CHANGE: 0

## 2023-08-21 NOTE — PROGRESS NOTES
mouth on Tuesdays and Thursdays. 24 tablet 2    [DISCONTINUED] meloxicam (MOBIC) 15 MG tablet Take 1 tablet by mouth daily 30 tablet 5    ketorolac (TORADOL) 10 MG tablet Take 1 tablet by mouth every 6 hours as needed for Pain (Patient not taking: Reported on 8/21/2023) 20 tablet 0    [DISCONTINUED] atorvastatin (LIPITOR) 10 MG tablet Take 1 tablet by mouth daily (Patient not taking: Reported on 7/17/2023) 90 tablet 1     No facility-administered encounter medications on file as of 8/21/2023. Current Outpatient Medications on File Prior to Visit   Medication Sig Dispense Refill    levothyroxine (SYNTHROID) 88 MCG tablet Take 1 tablet by mouth on Sun, Mon, Weds, Fri, Sat. 90 tablet 0    tamsulosin (FLOMAX) 0.4 MG capsule Take 1 capsule by mouth daily 30 capsule 0    ondansetron (ZOFRAN-ODT) 4 MG disintegrating tablet Take 1 tablet by mouth 3 times daily as needed for Nausea or Vomiting 21 tablet 0    levothyroxine (SYNTHROID) 100 MCG tablet Patient to take 100 MCG by mouth on Tuesdays and Thursdays. 24 tablet 2    meloxicam (MOBIC) 7.5 MG tablet Take 1 tablet by mouth daily 30 tablet 3    ketorolac (TORADOL) 10 MG tablet Take 1 tablet by mouth every 6 hours as needed for Pain (Patient not taking: Reported on 8/21/2023) 20 tablet 0     No current facility-administered medications on file prior to visit. Bactrim [sulfamethoxazole-trimethoprim], Cipro [ciprofloxacin hcl], and Macrobid [nitrofurantoin monohyd macro]  Family History   Problem Relation Age of Onset    Diabetes Father     Kidney Disease Father     Cancer Father     Other Other         No family h/o ovarian or breast cancer. Deep Vein Thrombosis Brother      Social History     Tobacco Use   Smoking Status Never   Smokeless Tobacco Never       Social History     Substance and Sexual Activity   Alcohol Use No       Review of Systems   Constitutional:  Negative for appetite change, chills and fever.    Eyes:  Negative for redness and visual

## 2023-08-21 NOTE — PATIENT INSTRUCTIONS
Survey: You may be receiving a survey from [employer] regarding your visit today. Please complete the survey to enable us to provide the highest quality of care to you and your family.     If you cannot score us as very good on any question, please call the office to discuss how we could have major experience exceptional.    Thank you    Your Urology Care Team.

## 2023-08-23 LAB
STONE COMPOSITION: NORMAL
STONE DESCRIPTION: NORMAL
STONE MASS: 21 MG

## 2023-08-24 ENCOUNTER — TELEPHONE (OUTPATIENT)
Dept: UROLOGY | Age: 63
End: 2023-08-24

## 2023-08-24 NOTE — TELEPHONE ENCOUNTER
----- Message from Nuala Cabot, APRN - CNP sent at 8/24/2023  9:08 AM EDT -----  [place] analysis showed calcium phosphate stones. Urine pH on recent UA did not indicate the need for any medical treatment for these types of stones. Stone formation is likely worsening due to ongoing thyroid issues. We recommend working with PCP or endocrinology to continue to manage thyroid levels. To prevent future stone formation:  1) drink around 80 ounces of water per day  2) Avoid/minimize intake of \"bad fluids\" (soda pop, coffee, tea, alcohol, energy drinks)  3) reduce consumption of sodium/salt  4) reduce consumption of fatty animal protein (full-fat cheese/milk/dairy, red meats, pork).  Limit protein intake to low-fat/lean options (low-fat dairy, fish, chicken, turkey)

## 2023-08-24 NOTE — TELEPHONE ENCOUNTER
----- Message from [facility] sent at 8/24/2023  2:18 PM EDT -----  Regarding: Jett Cohen: [phone]  Looking at the test results on my chart of the stone- it says see note under two categories but where can I find the note to read about it?

## 2023-09-21 ENCOUNTER — HOSPITAL ENCOUNTER (OUTPATIENT)
Dept: WOMENS IMAGING | Age: 63
Discharge: HOME | End: 2023-09-23
Attending: ADVANCED PRACTICE MIDWIFE
Payer: COMMERCIAL

## 2023-09-21 VITALS — HEIGHT: 67 IN | BODY MASS INDEX: 29.82 KG/M2 | WEIGHT: 190 LBS

## 2023-09-21 DIAGNOSIS — Z12.31 SCREENING MAMMOGRAM, ENCOUNTER FOR: ICD-10-CM

## 2023-09-21 PROCEDURE — 77063 BREAST TOMOSYNTHESIS BI: CPT

## 2024-07-02 ENCOUNTER — HOSPITAL ENCOUNTER (OUTPATIENT)
Age: 64
Setting detail: SPECIMEN
Discharge: HOME | End: 2024-07-02

## 2024-07-02 DIAGNOSIS — N23 RENAL COLIC: ICD-10-CM

## 2024-07-02 DIAGNOSIS — N30.00 ACUTE CYSTITIS WITHOUT HEMATURIA: ICD-10-CM

## 2024-07-02 LAB
ANION GAP SERPL CALCULATED.3IONS-SCNC: 11 MMOL/L (ref 9–16)
BACTERIA URNS QL MICRO: ABNORMAL
BILIRUB UR QL STRIP: NEGATIVE
BUN SERPL-MCNC: 23 MG/DL (ref 8–23)
CALCIUM SERPL-MCNC: 9.6 MG/DL (ref 8.6–10.4)
CASTS #/AREA URNS LPF: ABNORMAL /LPF (ref 0–8)
CHLORIDE SERPL-SCNC: 102 MMOL/L (ref 98–107)
CLARITY UR: CLEAR
CO2 SERPL-SCNC: 26 MMOL/L (ref 20–31)
COLOR UR: YELLOW
CREAT SERPL-MCNC: 0.9 MG/DL (ref 0.5–0.9)
EPI CELLS #/AREA URNS HPF: ABNORMAL /HPF (ref 0–5)
ERYTHROCYTE [DISTWIDTH] IN BLOOD BY AUTOMATED COUNT: 13 % (ref 11.8–14.4)
GFR, ESTIMATED: 68 ML/MIN/1.73M2
GLUCOSE SERPL-MCNC: 84 MG/DL (ref 74–99)
GLUCOSE UR STRIP-MCNC: NEGATIVE MG/DL
HCT VFR BLD AUTO: 44.4 % (ref 36.3–47.1)
HGB BLD-MCNC: 14.4 G/DL (ref 11.9–15.1)
HGB UR QL STRIP.AUTO: NEGATIVE
KETONES UR STRIP-MCNC: NEGATIVE MG/DL
LEUKOCYTE ESTERASE UR QL STRIP: ABNORMAL
MCH RBC QN AUTO: 30.3 PG (ref 25.2–33.5)
MCHC RBC AUTO-ENTMCNC: 32.4 G/DL (ref 28.4–34.8)
MCV RBC AUTO: 93.3 FL (ref 82.6–102.9)
NITRITE UR QL STRIP: NEGATIVE
NRBC BLD-RTO: 0 PER 100 WBC
PH UR STRIP: 5.5 [PH] (ref 5–8)
PLATELET # BLD AUTO: 260 K/UL (ref 138–453)
PMV BLD AUTO: 10.6 FL (ref 8.1–13.5)
POTASSIUM SERPL-SCNC: 4.4 MMOL/L (ref 3.7–5.3)
PROT UR STRIP-MCNC: NEGATIVE MG/DL
RBC # BLD AUTO: 4.76 M/UL (ref 3.95–5.11)
RBC #/AREA URNS HPF: ABNORMAL /HPF (ref 0–4)
SODIUM SERPL-SCNC: 139 MMOL/L (ref 136–145)
SP GR UR STRIP: 1.02 (ref 1–1.03)
UROBILINOGEN UR STRIP-ACNC: NORMAL EU/DL (ref 0–1)
WBC #/AREA URNS HPF: ABNORMAL /HPF (ref 0–5)
WBC OTHER # BLD: 5.9 K/UL (ref 3.5–11.3)

## 2024-07-03 LAB
MICROORGANISM SPEC CULT: NO GROWTH
SPECIMEN DESCRIPTION: NORMAL

## 2024-11-27 ENCOUNTER — HOSPITAL ENCOUNTER (OUTPATIENT)
Age: 64
Setting detail: SPECIMEN
Discharge: HOME | End: 2024-11-27

## 2024-11-27 DIAGNOSIS — E05.90 HYPERTHYROIDISM: Chronic | ICD-10-CM

## 2024-11-27 DIAGNOSIS — R79.9 ABNORMAL BLOOD CHEMISTRY: ICD-10-CM

## 2024-11-27 DIAGNOSIS — R73.01 ELEVATED FASTING GLUCOSE: ICD-10-CM

## 2024-11-27 PROBLEM — K59.00 CONSTIPATION: Status: RESOLVED | Noted: 2018-11-19 | Resolved: 2024-11-27

## 2024-11-27 LAB
ALBUMIN SERPL-MCNC: 4.5 G/DL (ref 3.5–5.2)
ALBUMIN/GLOB SERPL: 1.6 {RATIO} (ref 1–2.5)
ALP SERPL-CCNC: 105 U/L (ref 35–104)
ALT SERPL-CCNC: 30 U/L (ref 10–35)
ANION GAP SERPL CALCULATED.3IONS-SCNC: 13 MMOL/L (ref 9–16)
AST SERPL-CCNC: 31 U/L (ref 10–35)
BILIRUB SERPL-MCNC: 0.4 MG/DL (ref 0–1.2)
BUN SERPL-MCNC: 23 MG/DL (ref 8–23)
CALCIUM SERPL-MCNC: 10.2 MG/DL (ref 8.6–10.4)
CHLORIDE SERPL-SCNC: 103 MMOL/L (ref 98–107)
CO2 SERPL-SCNC: 24 MMOL/L (ref 20–31)
CREAT SERPL-MCNC: 0.9 MG/DL (ref 0.6–0.9)
EST. AVERAGE GLUCOSE BLD GHB EST-MCNC: 114 MG/DL
GFR, ESTIMATED: 71 ML/MIN/1.73M2
GLUCOSE SERPL-MCNC: 114 MG/DL (ref 74–99)
HBA1C MFR BLD: 5.6 % (ref 4–6)
POTASSIUM SERPL-SCNC: 3.7 MMOL/L (ref 3.7–5.3)
PROT SERPL-MCNC: 7.3 G/DL (ref 6.6–8.7)
SODIUM SERPL-SCNC: 140 MMOL/L (ref 136–145)
TSH SERPL DL<=0.05 MIU/L-ACNC: 1.45 UIU/ML (ref 0.27–4.2)

## 2025-05-09 PROBLEM — G89.29 CHRONIC BILATERAL LOW BACK PAIN WITHOUT SCIATICA: Status: ACTIVE | Noted: 2025-05-09

## 2025-05-09 PROBLEM — R73.01 ELEVATED FASTING GLUCOSE: Status: ACTIVE | Noted: 2025-05-09

## 2025-05-09 PROBLEM — M54.50 CHRONIC BILATERAL LOW BACK PAIN WITHOUT SCIATICA: Status: ACTIVE | Noted: 2025-05-09

## 2025-05-22 ENCOUNTER — OFFICE VISIT (OUTPATIENT)
Dept: OBGYN | Age: 65
End: 2025-05-22

## 2025-05-22 VITALS
DIASTOLIC BLOOD PRESSURE: 76 MMHG | SYSTOLIC BLOOD PRESSURE: 124 MMHG | BODY MASS INDEX: 29.1 KG/M2 | HEIGHT: 68 IN | WEIGHT: 192 LBS

## 2025-05-22 DIAGNOSIS — Z78.0 MENOPAUSE: ICD-10-CM

## 2025-05-22 DIAGNOSIS — Z12.89 ENCOUNTER FOR SCREENING FOR MALIGNANT NEOPLASM OF OTHER SITES: Primary | ICD-10-CM

## 2025-05-22 DIAGNOSIS — Z12.31 SCREENING MAMMOGRAM, ENCOUNTER FOR: ICD-10-CM

## 2025-05-22 ASSESSMENT — ENCOUNTER SYMPTOMS
BACK PAIN: 0
ABDOMINAL PAIN: 0
SHORTNESS OF BREATH: 0

## 2025-07-08 ENCOUNTER — HOSPITAL ENCOUNTER (OUTPATIENT)
Dept: WOMENS IMAGING | Age: 65
Discharge: HOME | End: 2025-07-10
Attending: ADVANCED PRACTICE MIDWIFE
Payer: MEDICARE

## 2025-07-08 VITALS — HEIGHT: 67 IN | WEIGHT: 185 LBS | BODY MASS INDEX: 29.03 KG/M2

## 2025-07-08 DIAGNOSIS — Z12.31 SCREENING MAMMOGRAM, ENCOUNTER FOR: ICD-10-CM

## 2025-07-08 DIAGNOSIS — Z78.0 MENOPAUSE: ICD-10-CM

## 2025-07-08 PROCEDURE — 77080 DXA BONE DENSITY AXIAL: CPT

## 2025-07-08 PROCEDURE — 77067 SCR MAMMO BI INCL CAD: CPT

## (undated) DEVICE — SOLUTION IV IRRIG WATER 1000ML POUR BRL 2F7114

## (undated) DEVICE — NITINOL STONE RETRIEVAL BASKET: Brand: ZERO TIP

## (undated) DEVICE — SOLUTION IRRIG 3000ML 0.9% SOD CHL USP UROMATIC PLAS CONT

## (undated) DEVICE — Device

## (undated) DEVICE — SINGLE ACTION PUMPING SYSTEM

## (undated) DEVICE — GUIDEWIRE VASC STR 3 CM 0.035 INX150 CM STD NIT ZIPWIRE

## (undated) DEVICE — GOWN,AURORA,NON-REINFORCED,2XL: Brand: MEDLINE

## (undated) DEVICE — GLOVE ORANGE PI 7 1/2   MSG9075

## (undated) DEVICE — MASTISOL ADHESIVE LIQ 2/3ML

## (undated) DEVICE — ADAPTER URO SCP UROLOK LL

## (undated) DEVICE — URETEROSCOPE FLX DIGITAL 3.1X650 MM 1.2 MM AXIS DISP

## (undated) DEVICE — Z INACTIVE USE 2660664 SOLUTION IRRIG 3000ML 0.9% SOD CHL USP UROMATIC PLAS CONT

## (undated) DEVICE — Z DISCONTINUED BY MEDLINE USE 2711682 TRAY SKIN PREP DRY W/ PREM GLV

## (undated) DEVICE — SYRINGE MED 20ML STD CLR PLAS LUERLOCK TIP N CTRL DISP

## (undated) DEVICE — CATHETER URET 5FR L70CM OPN END SGL LUMN INJ HUB FLEXIMA

## (undated) DEVICE — SOLUTION SURG PREP ANTIMICROBIAL 4 OZ SKIN WND EXIDINE

## (undated) DEVICE — FIBER LASER HOLM 11046] FORTEC MEDICAL INC]

## (undated) DEVICE — SPONGE GZ W4XL4IN CELOS POSTOP AVANT

## (undated) DEVICE — STRIP,CLOSURE,WOUND,MEDI-STRIP,1/2X4: Brand: MEDLINE

## (undated) DEVICE — FIBER LSR 365UM HOLM